# Patient Record
Sex: FEMALE | Race: WHITE | NOT HISPANIC OR LATINO | Employment: OTHER | RURAL
[De-identification: names, ages, dates, MRNs, and addresses within clinical notes are randomized per-mention and may not be internally consistent; named-entity substitution may affect disease eponyms.]

---

## 2017-05-18 ENCOUNTER — OFFICE VISIT (OUTPATIENT)
Dept: CARDIOLOGY | Facility: CLINIC | Age: 67
End: 2017-05-18

## 2017-05-18 VITALS — WEIGHT: 220 LBS | DIASTOLIC BLOOD PRESSURE: 102 MMHG | SYSTOLIC BLOOD PRESSURE: 154 MMHG | HEART RATE: 94 BPM

## 2017-05-18 DIAGNOSIS — M79.89 SWELLING OF LIMB: ICD-10-CM

## 2017-05-18 DIAGNOSIS — N28.0 RENAL INFARCT (HCC): ICD-10-CM

## 2017-05-18 DIAGNOSIS — I48.0 PAF (PAROXYSMAL ATRIAL FIBRILLATION) (HCC): Primary | ICD-10-CM

## 2017-05-18 PROCEDURE — 93000 ELECTROCARDIOGRAM COMPLETE: CPT | Performed by: INTERNAL MEDICINE

## 2017-05-18 PROCEDURE — 99213 OFFICE O/P EST LOW 20 MIN: CPT | Performed by: INTERNAL MEDICINE

## 2017-05-18 RX ORDER — PRAVASTATIN SODIUM 20 MG
20 TABLET ORAL DAILY
COMMUNITY

## 2017-05-18 RX ORDER — FUROSEMIDE 40 MG/1
40 TABLET ORAL DAILY
COMMUNITY

## 2017-05-18 RX ORDER — METOPROLOL TARTRATE 50 MG/1
75 TABLET, FILM COATED ORAL 2 TIMES DAILY
COMMUNITY

## 2017-05-18 RX ORDER — ACETAMINOPHEN 500 MG
500 TABLET ORAL EVERY 6 HOURS PRN
COMMUNITY

## 2017-05-18 RX ORDER — FLUTICASONE PROPIONATE 50 MCG
2 SPRAY, SUSPENSION (ML) NASAL DAILY
COMMUNITY

## 2025-04-03 ENCOUNTER — APPOINTMENT (OUTPATIENT)
Dept: GENERAL RADIOLOGY | Facility: HOSPITAL | Age: 75
End: 2025-04-03
Payer: MEDICARE

## 2025-04-03 ENCOUNTER — HOSPITAL ENCOUNTER (INPATIENT)
Facility: HOSPITAL | Age: 75
LOS: 4 days | Discharge: HOME HEALTH | End: 2025-04-08
Attending: STUDENT IN AN ORGANIZED HEALTH CARE EDUCATION/TRAINING PROGRAM | Admitting: STUDENT IN AN ORGANIZED HEALTH CARE EDUCATION/TRAINING PROGRAM
Payer: MEDICARE

## 2025-04-03 ENCOUNTER — APPOINTMENT (OUTPATIENT)
Dept: CT IMAGING | Facility: HOSPITAL | Age: 75
End: 2025-04-03
Payer: MEDICARE

## 2025-04-03 DIAGNOSIS — A41.9 SEPSIS WITH ACUTE ORGAN DYSFUNCTION WITHOUT SEPTIC SHOCK, DUE TO UNSPECIFIED ORGANISM, UNSPECIFIED ORGAN DYSFUNCTION TYPE: ICD-10-CM

## 2025-04-03 DIAGNOSIS — J96.01 ACUTE HYPOXEMIC RESPIRATORY FAILURE: ICD-10-CM

## 2025-04-03 DIAGNOSIS — I48.91 ATRIAL FIBRILLATION WITH RAPID VENTRICULAR RESPONSE: ICD-10-CM

## 2025-04-03 DIAGNOSIS — R65.20 SEPSIS WITH ACUTE ORGAN DYSFUNCTION WITHOUT SEPTIC SHOCK, DUE TO UNSPECIFIED ORGANISM, UNSPECIFIED ORGAN DYSFUNCTION TYPE: ICD-10-CM

## 2025-04-03 DIAGNOSIS — J18.9 MULTIFOCAL PNEUMONIA: Primary | ICD-10-CM

## 2025-04-03 LAB
ALBUMIN SERPL-MCNC: 3.7 G/DL (ref 3.5–5.2)
ALBUMIN/GLOB SERPL: 0.9 G/DL
ALP SERPL-CCNC: 112 U/L (ref 39–117)
ALT SERPL W P-5'-P-CCNC: 22 U/L (ref 1–33)
ANION GAP SERPL CALCULATED.3IONS-SCNC: 12.7 MMOL/L (ref 5–15)
APTT PPP: 40 SECONDS (ref 24.3–38.1)
ARTERIAL PATENCY WRIST A: ABNORMAL
AST SERPL-CCNC: 26 U/L (ref 1–32)
ATMOSPHERIC PRESS: 744 MMHG
BASE EXCESS BLDA CALC-SCNC: -1 MMOL/L (ref 0–2)
BASOPHILS # BLD AUTO: 0.04 10*3/MM3 (ref 0–0.2)
BASOPHILS NFR BLD AUTO: 0.2 % (ref 0–1.5)
BDY SITE: ABNORMAL
BILIRUB SERPL-MCNC: 1.7 MG/DL (ref 0–1.2)
BILIRUB UR QL STRIP: NEGATIVE
BODY TEMPERATURE: 37
BUN SERPL-MCNC: 10 MG/DL (ref 8–23)
BUN/CREAT SERPL: 11.1 (ref 7–25)
CALCIUM SPEC-SCNC: 10.6 MG/DL (ref 8.6–10.5)
CHLORIDE SERPL-SCNC: 101 MMOL/L (ref 98–107)
CLARITY UR: CLEAR
CO2 SERPL-SCNC: 25.3 MMOL/L (ref 22–29)
COLOR UR: ABNORMAL
CREAT SERPL-MCNC: 0.9 MG/DL (ref 0.57–1)
D-LACTATE SERPL-SCNC: 1.7 MMOL/L (ref 0.5–2)
DEPRECATED RDW RBC AUTO: 49 FL (ref 37–54)
EGFRCR SERPLBLD CKD-EPI 2021: 66.4 ML/MIN/1.73
EOSINOPHIL # BLD AUTO: 0 10*3/MM3 (ref 0–0.4)
EOSINOPHIL NFR BLD AUTO: 0 % (ref 0.3–6.2)
ERYTHROCYTE [DISTWIDTH] IN BLOOD BY AUTOMATED COUNT: 14.1 % (ref 12.3–15.4)
GAS FLOW AIRWAY: 6 LPM
GLOBULIN UR ELPH-MCNC: 4.2 GM/DL
GLUCOSE SERPL-MCNC: 177 MG/DL (ref 65–99)
GLUCOSE UR STRIP-MCNC: NEGATIVE MG/DL
HCO3 BLDA-SCNC: 26.1 MMOL/L (ref 20–26)
HCT VFR BLD AUTO: 47.8 % (ref 34–46.6)
HGB BLD-MCNC: 15.5 G/DL (ref 12–15.9)
HGB BLDA-MCNC: 17 G/DL (ref 13.5–17.5)
HGB UR QL STRIP.AUTO: ABNORMAL
HOLD SPECIMEN: NORMAL
HOLD SPECIMEN: NORMAL
IMM GRANULOCYTES # BLD AUTO: 0.1 10*3/MM3 (ref 0–0.05)
IMM GRANULOCYTES NFR BLD AUTO: 0.5 % (ref 0–0.5)
INR PPP: 3.39 (ref 0.9–1.1)
KETONES UR QL STRIP: NEGATIVE
LEUKOCYTE ESTERASE UR QL STRIP.AUTO: ABNORMAL
LYMPHOCYTES # BLD AUTO: 0.88 10*3/MM3 (ref 0.7–3.1)
LYMPHOCYTES NFR BLD AUTO: 4.5 % (ref 19.6–45.3)
Lab: ABNORMAL
MCH RBC QN AUTO: 30.8 PG (ref 26.6–33)
MCHC RBC AUTO-ENTMCNC: 32.4 G/DL (ref 31.5–35.7)
MCV RBC AUTO: 94.8 FL (ref 79–97)
MODALITY: ABNORMAL
MONOCYTES # BLD AUTO: 1.45 10*3/MM3 (ref 0.1–0.9)
MONOCYTES NFR BLD AUTO: 7.4 % (ref 5–12)
NEUTROPHILS NFR BLD AUTO: 17.15 10*3/MM3 (ref 1.7–7)
NEUTROPHILS NFR BLD AUTO: 87.4 % (ref 42.7–76)
NITRITE UR QL STRIP: NEGATIVE
NRBC BLD AUTO-RTO: 0 /100 WBC (ref 0–0.2)
NT-PROBNP SERPL-MCNC: 428 PG/ML (ref 0–1800)
PCO2 BLDA: 50.8 MM HG (ref 35–45)
PCO2 TEMP ADJ BLD: 50.8 MM HG (ref 35–45)
PH BLDA: 7.32 PH UNITS (ref 7.35–7.45)
PH UR STRIP.AUTO: 7 [PH] (ref 4.5–8)
PH, TEMP CORRECTED: 7.32 PH UNITS (ref 7.35–7.45)
PLATELET # BLD AUTO: 291 10*3/MM3 (ref 140–450)
PMV BLD AUTO: 9.3 FL (ref 6–12)
PO2 BLDA: 146 MM HG (ref 83–108)
PO2 TEMP ADJ BLD: 146 MM HG (ref 83–108)
POTASSIUM SERPL-SCNC: 4.2 MMOL/L (ref 3.5–5.2)
PROCALCITONIN SERPL-MCNC: 0.25 NG/ML (ref 0–0.25)
PROT SERPL-MCNC: 7.9 G/DL (ref 6–8.5)
PROT UR QL STRIP: ABNORMAL
PROTHROMBIN TIME: 35 SECONDS (ref 12.1–15)
RBC # BLD AUTO: 5.04 10*6/MM3 (ref 3.77–5.28)
SAO2 % BLDCOA: 98.1 % (ref 94–99)
SODIUM SERPL-SCNC: 139 MMOL/L (ref 136–145)
SP GR UR STRIP: 1.03 (ref 1–1.03)
TROPONIN T SERPL HS-MCNC: 22 NG/L
UROBILINOGEN UR QL STRIP: ABNORMAL
VENTILATOR MODE: ABNORMAL
WBC NRBC COR # BLD AUTO: 19.62 10*3/MM3 (ref 3.4–10.8)
WHOLE BLOOD HOLD COAG: NORMAL
WHOLE BLOOD HOLD SPECIMEN: NORMAL

## 2025-04-03 PROCEDURE — 71045 X-RAY EXAM CHEST 1 VIEW: CPT

## 2025-04-03 PROCEDURE — 71275 CT ANGIOGRAPHY CHEST: CPT

## 2025-04-03 PROCEDURE — 25810000003 SODIUM CHLORIDE 0.9 % SOLUTION: Performed by: STUDENT IN AN ORGANIZED HEALTH CARE EDUCATION/TRAINING PROGRAM

## 2025-04-03 PROCEDURE — 25010000002 CEFEPIME PER 500 MG: Performed by: STUDENT IN AN ORGANIZED HEALTH CARE EDUCATION/TRAINING PROGRAM

## 2025-04-03 PROCEDURE — 85610 PROTHROMBIN TIME: CPT | Performed by: STUDENT IN AN ORGANIZED HEALTH CARE EDUCATION/TRAINING PROGRAM

## 2025-04-03 PROCEDURE — 25010000002 AZITHROMYCIN PER 500 MG: Performed by: STUDENT IN AN ORGANIZED HEALTH CARE EDUCATION/TRAINING PROGRAM

## 2025-04-03 PROCEDURE — 85730 THROMBOPLASTIN TIME PARTIAL: CPT | Performed by: STUDENT IN AN ORGANIZED HEALTH CARE EDUCATION/TRAINING PROGRAM

## 2025-04-03 PROCEDURE — 93005 ELECTROCARDIOGRAM TRACING: CPT | Performed by: STUDENT IN AN ORGANIZED HEALTH CARE EDUCATION/TRAINING PROGRAM

## 2025-04-03 PROCEDURE — 84145 PROCALCITONIN (PCT): CPT | Performed by: STUDENT IN AN ORGANIZED HEALTH CARE EDUCATION/TRAINING PROGRAM

## 2025-04-03 PROCEDURE — 82803 BLOOD GASES ANY COMBINATION: CPT

## 2025-04-03 PROCEDURE — 83605 ASSAY OF LACTIC ACID: CPT | Performed by: STUDENT IN AN ORGANIZED HEALTH CARE EDUCATION/TRAINING PROGRAM

## 2025-04-03 PROCEDURE — 99285 EMERGENCY DEPT VISIT HI MDM: CPT | Performed by: STUDENT IN AN ORGANIZED HEALTH CARE EDUCATION/TRAINING PROGRAM

## 2025-04-03 PROCEDURE — 25810000003 SODIUM CHLORIDE 0.9 % SOLUTION 500 ML FLEX CONT: Performed by: STUDENT IN AN ORGANIZED HEALTH CARE EDUCATION/TRAINING PROGRAM

## 2025-04-03 PROCEDURE — 93010 ELECTROCARDIOGRAM REPORT: CPT | Performed by: INTERNAL MEDICINE

## 2025-04-03 PROCEDURE — 85025 COMPLETE CBC W/AUTO DIFF WBC: CPT | Performed by: STUDENT IN AN ORGANIZED HEALTH CARE EDUCATION/TRAINING PROGRAM

## 2025-04-03 PROCEDURE — 36600 WITHDRAWAL OF ARTERIAL BLOOD: CPT

## 2025-04-03 PROCEDURE — 25010000002 VANCOMYCIN 1.75 G RECONSTITUTED SOLUTION 1 EACH VIAL: Performed by: STUDENT IN AN ORGANIZED HEALTH CARE EDUCATION/TRAINING PROGRAM

## 2025-04-03 PROCEDURE — 87040 BLOOD CULTURE FOR BACTERIA: CPT | Performed by: STUDENT IN AN ORGANIZED HEALTH CARE EDUCATION/TRAINING PROGRAM

## 2025-04-03 PROCEDURE — 83880 ASSAY OF NATRIURETIC PEPTIDE: CPT | Performed by: STUDENT IN AN ORGANIZED HEALTH CARE EDUCATION/TRAINING PROGRAM

## 2025-04-03 PROCEDURE — 25810000003 SODIUM CHLORIDE 0.9 % SOLUTION 250 ML FLEX CONT: Performed by: STUDENT IN AN ORGANIZED HEALTH CARE EDUCATION/TRAINING PROGRAM

## 2025-04-03 PROCEDURE — 81001 URINALYSIS AUTO W/SCOPE: CPT | Performed by: STUDENT IN AN ORGANIZED HEALTH CARE EDUCATION/TRAINING PROGRAM

## 2025-04-03 PROCEDURE — 80053 COMPREHEN METABOLIC PANEL: CPT | Performed by: STUDENT IN AN ORGANIZED HEALTH CARE EDUCATION/TRAINING PROGRAM

## 2025-04-03 PROCEDURE — 84484 ASSAY OF TROPONIN QUANT: CPT | Performed by: STUDENT IN AN ORGANIZED HEALTH CARE EDUCATION/TRAINING PROGRAM

## 2025-04-03 PROCEDURE — P9612 CATHETERIZE FOR URINE SPEC: HCPCS

## 2025-04-03 RX ORDER — SODIUM CHLORIDE 0.9 % (FLUSH) 0.9 %
10 SYRINGE (ML) INJECTION AS NEEDED
Status: DISCONTINUED | OUTPATIENT
Start: 2025-04-03 | End: 2025-04-08 | Stop reason: HOSPADM

## 2025-04-03 RX ORDER — FUROSEMIDE 10 MG/ML
60 INJECTION INTRAMUSCULAR; INTRAVENOUS ONCE
Status: DISCONTINUED | OUTPATIENT
Start: 2025-04-03 | End: 2025-04-03

## 2025-04-03 RX ORDER — DEXAMETHASONE SODIUM PHOSPHATE 4 MG/ML
10 INJECTION, SOLUTION INTRA-ARTICULAR; INTRALESIONAL; INTRAMUSCULAR; INTRAVENOUS; SOFT TISSUE ONCE
Status: COMPLETED | OUTPATIENT
Start: 2025-04-03 | End: 2025-04-04

## 2025-04-03 RX ORDER — LACTULOSE 10 G/15ML
10 SOLUTION ORAL 3 TIMES DAILY
COMMUNITY
Start: 2025-04-01

## 2025-04-03 RX ORDER — IPRATROPIUM BROMIDE AND ALBUTEROL SULFATE 2.5; .5 MG/3ML; MG/3ML
3 SOLUTION RESPIRATORY (INHALATION) ONCE
Status: COMPLETED | OUTPATIENT
Start: 2025-04-03 | End: 2025-04-04

## 2025-04-03 RX ADMIN — AZITHROMYCIN MONOHYDRATE 500 MG: 500 INJECTION, POWDER, LYOPHILIZED, FOR SOLUTION INTRAVENOUS at 23:37

## 2025-04-03 RX ADMIN — SODIUM CHLORIDE 500 ML: 0.9 INJECTION, SOLUTION INTRAVENOUS at 23:16

## 2025-04-03 RX ADMIN — CEFEPIME 2000 MG: 2 INJECTION, POWDER, FOR SOLUTION INTRAVENOUS at 23:16

## 2025-04-03 RX ADMIN — VANCOMYCIN HYDROCHLORIDE 1750 MG: 1.75 INJECTION, POWDER, LYOPHILIZED, FOR SOLUTION INTRAVENOUS at 23:27

## 2025-04-04 ENCOUNTER — APPOINTMENT (OUTPATIENT)
Dept: ULTRASOUND IMAGING | Facility: HOSPITAL | Age: 75
End: 2025-04-04
Payer: MEDICARE

## 2025-04-04 PROBLEM — I10 HTN (HYPERTENSION): Status: ACTIVE | Noted: 2025-04-04

## 2025-04-04 PROBLEM — E78.5 HLD (HYPERLIPIDEMIA): Status: ACTIVE | Noted: 2025-04-04

## 2025-04-04 PROBLEM — J18.9 PNEUMONIA: Status: ACTIVE | Noted: 2025-04-04

## 2025-04-04 PROBLEM — I48.20 CHRONIC ATRIAL FIBRILLATION WITH RVR: Status: ACTIVE | Noted: 2025-04-04

## 2025-04-04 PROBLEM — J47.9 BRONCHIECTASIS: Status: ACTIVE | Noted: 2025-04-04

## 2025-04-04 PROBLEM — A41.9 SEPSIS DUE TO PNEUMONIA: Status: ACTIVE | Noted: 2025-04-04

## 2025-04-04 PROBLEM — J96.01 ACUTE HYPOXIC RESPIRATORY FAILURE: Status: ACTIVE | Noted: 2025-04-04

## 2025-04-04 LAB
ALBUMIN SERPL-MCNC: 3.2 G/DL (ref 3.5–5.2)
ALBUMIN/GLOB SERPL: 0.9 G/DL
ALP SERPL-CCNC: 94 U/L (ref 39–117)
ALT SERPL W P-5'-P-CCNC: 24 U/L (ref 1–33)
AMMONIA BLD-SCNC: 53 UMOL/L (ref 11–51)
ANION GAP SERPL CALCULATED.3IONS-SCNC: 10.3 MMOL/L (ref 5–15)
AST SERPL-CCNC: 25 U/L (ref 1–32)
BACTERIA UR QL AUTO: ABNORMAL /HPF
BILIRUB SERPL-MCNC: 1.3 MG/DL (ref 0–1.2)
BUN SERPL-MCNC: 11 MG/DL (ref 8–23)
BUN/CREAT SERPL: 14.5 (ref 7–25)
CALCIUM SPEC-SCNC: 9.7 MG/DL (ref 8.6–10.5)
CHLORIDE SERPL-SCNC: 105 MMOL/L (ref 98–107)
CO2 SERPL-SCNC: 22.7 MMOL/L (ref 22–29)
CREAT SERPL-MCNC: 0.76 MG/DL (ref 0.57–1)
DEPRECATED RDW RBC AUTO: 49.7 FL (ref 37–54)
EGFRCR SERPLBLD CKD-EPI 2021: 81.3 ML/MIN/1.73
ERYTHROCYTE [DISTWIDTH] IN BLOOD BY AUTOMATED COUNT: 14.2 % (ref 12.3–15.4)
GEN 5 1HR TROPONIN T REFLEX: 25 NG/L
GLOBULIN UR ELPH-MCNC: 3.6 GM/DL
GLUCOSE BLDC GLUCOMTR-MCNC: 108 MG/DL (ref 70–130)
GLUCOSE BLDC GLUCOMTR-MCNC: 148 MG/DL (ref 70–130)
GLUCOSE BLDC GLUCOMTR-MCNC: 204 MG/DL (ref 70–130)
GLUCOSE BLDC GLUCOMTR-MCNC: 231 MG/DL (ref 70–130)
GLUCOSE SERPL-MCNC: 229 MG/DL (ref 65–99)
HCT VFR BLD AUTO: 49.9 % (ref 34–46.6)
HGB BLD-MCNC: 16.4 G/DL (ref 12–15.9)
HYALINE CASTS UR QL AUTO: ABNORMAL /LPF
INR PPP: 3.54 (ref 0.9–1.1)
MCH RBC QN AUTO: 31.2 PG (ref 26.6–33)
MCHC RBC AUTO-ENTMCNC: 32.9 G/DL (ref 31.5–35.7)
MCV RBC AUTO: 94.9 FL (ref 79–97)
MRSA DNA SPEC QL NAA+PROBE: NORMAL
PLATELET # BLD AUTO: 345 10*3/MM3 (ref 140–450)
PMV BLD AUTO: 9.9 FL (ref 6–12)
POTASSIUM SERPL-SCNC: 4.3 MMOL/L (ref 3.5–5.2)
PROT SERPL-MCNC: 6.8 G/DL (ref 6–8.5)
PROTHROMBIN TIME: 36.2 SECONDS (ref 12.1–15)
QT INTERVAL: 293 MS
QTC INTERVAL: 427 MS
RBC # BLD AUTO: 5.26 10*6/MM3 (ref 3.77–5.28)
RBC # UR STRIP: ABNORMAL /HPF
RBC CASTS #/AREA URNS LPF: ABNORMAL /LPF
REF LAB TEST METHOD: ABNORMAL
SODIUM SERPL-SCNC: 138 MMOL/L (ref 136–145)
SQUAMOUS #/AREA URNS HPF: ABNORMAL /HPF
TRANS CELLS #/AREA URNS HPF: ABNORMAL /HPF
TROPONIN T % DELTA: 14
TROPONIN T NUMERIC DELTA: 3 NG/L
VANCOMYCIN SERPL-MCNC: 11.6 MCG/ML (ref 5–40)
WBC # UR STRIP: ABNORMAL /HPF
WBC NRBC COR # BLD AUTO: 22.97 10*3/MM3 (ref 3.4–10.8)

## 2025-04-04 PROCEDURE — 82140 ASSAY OF AMMONIA: CPT | Performed by: STUDENT IN AN ORGANIZED HEALTH CARE EDUCATION/TRAINING PROGRAM

## 2025-04-04 PROCEDURE — 97161 PT EVAL LOW COMPLEX 20 MIN: CPT

## 2025-04-04 PROCEDURE — 25510000001 IOPAMIDOL PER 1 ML: Performed by: STUDENT IN AN ORGANIZED HEALTH CARE EDUCATION/TRAINING PROGRAM

## 2025-04-04 PROCEDURE — 94664 DEMO&/EVAL PT USE INHALER: CPT

## 2025-04-04 PROCEDURE — 25010000002 CEFEPIME PER 500 MG: Performed by: STUDENT IN AN ORGANIZED HEALTH CARE EDUCATION/TRAINING PROGRAM

## 2025-04-04 PROCEDURE — 94799 UNLISTED PULMONARY SVC/PX: CPT

## 2025-04-04 PROCEDURE — 63710000001 INSULIN LISPRO (HUMAN) PER 5 UNITS: Performed by: STUDENT IN AN ORGANIZED HEALTH CARE EDUCATION/TRAINING PROGRAM

## 2025-04-04 PROCEDURE — 94761 N-INVAS EAR/PLS OXIMETRY MLT: CPT

## 2025-04-04 PROCEDURE — 87641 MR-STAPH DNA AMP PROBE: CPT | Performed by: STUDENT IN AN ORGANIZED HEALTH CARE EDUCATION/TRAINING PROGRAM

## 2025-04-04 PROCEDURE — 92610 EVALUATE SWALLOWING FUNCTION: CPT

## 2025-04-04 PROCEDURE — 99223 1ST HOSP IP/OBS HIGH 75: CPT | Performed by: STUDENT IN AN ORGANIZED HEALTH CARE EDUCATION/TRAINING PROGRAM

## 2025-04-04 PROCEDURE — 85610 PROTHROMBIN TIME: CPT | Performed by: STUDENT IN AN ORGANIZED HEALTH CARE EDUCATION/TRAINING PROGRAM

## 2025-04-04 PROCEDURE — 25010000002 DEXAMETHASONE PER 1 MG: Performed by: STUDENT IN AN ORGANIZED HEALTH CARE EDUCATION/TRAINING PROGRAM

## 2025-04-04 PROCEDURE — 82948 REAGENT STRIP/BLOOD GLUCOSE: CPT

## 2025-04-04 PROCEDURE — 94640 AIRWAY INHALATION TREATMENT: CPT

## 2025-04-04 PROCEDURE — 97165 OT EVAL LOW COMPLEX 30 MIN: CPT

## 2025-04-04 PROCEDURE — 84484 ASSAY OF TROPONIN QUANT: CPT | Performed by: STUDENT IN AN ORGANIZED HEALTH CARE EDUCATION/TRAINING PROGRAM

## 2025-04-04 PROCEDURE — 97535 SELF CARE MNGMENT TRAINING: CPT

## 2025-04-04 PROCEDURE — 80053 COMPREHEN METABOLIC PANEL: CPT | Performed by: STUDENT IN AN ORGANIZED HEALTH CARE EDUCATION/TRAINING PROGRAM

## 2025-04-04 PROCEDURE — 25010000002 VANCOMYCIN 1.5 G RECONSTITUTED SOLUTION 1 EACH VIAL: Performed by: STUDENT IN AN ORGANIZED HEALTH CARE EDUCATION/TRAINING PROGRAM

## 2025-04-04 PROCEDURE — 76705 ECHO EXAM OF ABDOMEN: CPT

## 2025-04-04 PROCEDURE — 25810000003 SODIUM CHLORIDE 0.9 % SOLUTION 500 ML FLEX CONT: Performed by: STUDENT IN AN ORGANIZED HEALTH CARE EDUCATION/TRAINING PROGRAM

## 2025-04-04 PROCEDURE — 85027 COMPLETE CBC AUTOMATED: CPT | Performed by: STUDENT IN AN ORGANIZED HEALTH CARE EDUCATION/TRAINING PROGRAM

## 2025-04-04 PROCEDURE — 80202 ASSAY OF VANCOMYCIN: CPT | Performed by: STUDENT IN AN ORGANIZED HEALTH CARE EDUCATION/TRAINING PROGRAM

## 2025-04-04 RX ORDER — NYSTATIN 100000 U/G
1 OINTMENT TOPICAL EVERY 12 HOURS SCHEDULED
Status: DISCONTINUED | OUTPATIENT
Start: 2025-04-04 | End: 2025-04-08 | Stop reason: HOSPADM

## 2025-04-04 RX ORDER — WARFARIN SODIUM 7.5 MG/1
7.5 TABLET ORAL
COMMUNITY

## 2025-04-04 RX ORDER — METOPROLOL TARTRATE 25 MG/1
25 TABLET, FILM COATED ORAL EVERY 12 HOURS SCHEDULED
Status: DISCONTINUED | OUTPATIENT
Start: 2025-04-04 | End: 2025-04-08 | Stop reason: HOSPADM

## 2025-04-04 RX ORDER — DILTIAZEM HYDROCHLORIDE 5 MG/ML
10 INJECTION INTRAVENOUS ONCE
Status: COMPLETED | OUTPATIENT
Start: 2025-04-04 | End: 2025-04-04

## 2025-04-04 RX ORDER — ACETAMINOPHEN 160 MG/5ML
650 SOLUTION ORAL EVERY 4 HOURS PRN
Status: DISCONTINUED | OUTPATIENT
Start: 2025-04-04 | End: 2025-04-08 | Stop reason: HOSPADM

## 2025-04-04 RX ORDER — NITROGLYCERIN 0.4 MG/1
0.4 TABLET SUBLINGUAL
Status: DISCONTINUED | OUTPATIENT
Start: 2025-04-04 | End: 2025-04-08 | Stop reason: HOSPADM

## 2025-04-04 RX ORDER — ALBUTEROL SULFATE 0.83 MG/ML
2.5 SOLUTION RESPIRATORY (INHALATION)
Status: COMPLETED | OUTPATIENT
Start: 2025-04-04 | End: 2025-04-04

## 2025-04-04 RX ORDER — IPRATROPIUM BROMIDE AND ALBUTEROL SULFATE 2.5; .5 MG/3ML; MG/3ML
3 SOLUTION RESPIRATORY (INHALATION)
Status: DISCONTINUED | OUTPATIENT
Start: 2025-04-04 | End: 2025-04-07

## 2025-04-04 RX ORDER — SODIUM CHLORIDE 9 MG/ML
40 INJECTION, SOLUTION INTRAVENOUS AS NEEDED
Status: DISCONTINUED | OUTPATIENT
Start: 2025-04-04 | End: 2025-04-08 | Stop reason: HOSPADM

## 2025-04-04 RX ORDER — ATORVASTATIN CALCIUM 40 MG/1
40 TABLET, FILM COATED ORAL NIGHTLY
Status: DISCONTINUED | OUTPATIENT
Start: 2025-04-04 | End: 2025-04-08 | Stop reason: HOSPADM

## 2025-04-04 RX ORDER — METOPROLOL SUCCINATE 25 MG/1
25 TABLET, EXTENDED RELEASE ORAL DAILY
COMMUNITY

## 2025-04-04 RX ORDER — NICOTINE POLACRILEX 4 MG
15 LOZENGE BUCCAL
Status: DISCONTINUED | OUTPATIENT
Start: 2025-04-04 | End: 2025-04-08 | Stop reason: HOSPADM

## 2025-04-04 RX ORDER — AMOXICILLIN 250 MG
2 CAPSULE ORAL 2 TIMES DAILY PRN
Status: DISCONTINUED | OUTPATIENT
Start: 2025-04-04 | End: 2025-04-08 | Stop reason: HOSPADM

## 2025-04-04 RX ORDER — ACETAMINOPHEN 325 MG/1
650 TABLET ORAL EVERY 4 HOURS PRN
Status: DISCONTINUED | OUTPATIENT
Start: 2025-04-04 | End: 2025-04-08 | Stop reason: HOSPADM

## 2025-04-04 RX ORDER — NYSTATIN 100000 [USP'U]/G
POWDER TOPICAL EVERY 12 HOURS SCHEDULED
Status: DISCONTINUED | OUTPATIENT
Start: 2025-04-04 | End: 2025-04-08 | Stop reason: HOSPADM

## 2025-04-04 RX ORDER — ALBUTEROL SULFATE 0.83 MG/ML
SOLUTION RESPIRATORY (INHALATION)
Status: COMPLETED
Start: 2025-04-04 | End: 2025-04-04

## 2025-04-04 RX ORDER — UBIDECARENONE 75 MG
100 CAPSULE ORAL DAILY
Status: DISCONTINUED | OUTPATIENT
Start: 2025-04-04 | End: 2025-04-04

## 2025-04-04 RX ORDER — UBIDECARENONE 75 MG
100 CAPSULE ORAL DAILY
Status: DISCONTINUED | OUTPATIENT
Start: 2025-04-05 | End: 2025-04-08 | Stop reason: HOSPADM

## 2025-04-04 RX ORDER — ACETAMINOPHEN 650 MG/1
650 SUPPOSITORY RECTAL EVERY 4 HOURS PRN
Status: DISCONTINUED | OUTPATIENT
Start: 2025-04-04 | End: 2025-04-08 | Stop reason: HOSPADM

## 2025-04-04 RX ORDER — DEXTROSE MONOHYDRATE 25 G/50ML
25 INJECTION, SOLUTION INTRAVENOUS
Status: DISCONTINUED | OUTPATIENT
Start: 2025-04-04 | End: 2025-04-08 | Stop reason: HOSPADM

## 2025-04-04 RX ORDER — WARFARIN SODIUM 5 MG/1
5 TABLET ORAL
Status: DISCONTINUED | OUTPATIENT
Start: 2025-04-04 | End: 2025-04-04

## 2025-04-04 RX ORDER — SODIUM CHLORIDE 0.9 % (FLUSH) 0.9 %
10 SYRINGE (ML) INJECTION EVERY 12 HOURS SCHEDULED
Status: DISCONTINUED | OUTPATIENT
Start: 2025-04-04 | End: 2025-04-08 | Stop reason: HOSPADM

## 2025-04-04 RX ORDER — SODIUM CHLORIDE 0.9 % (FLUSH) 0.9 %
10 SYRINGE (ML) INJECTION AS NEEDED
Status: DISCONTINUED | OUTPATIENT
Start: 2025-04-04 | End: 2025-04-08 | Stop reason: HOSPADM

## 2025-04-04 RX ORDER — INSULIN LISPRO 100 [IU]/ML
2-7 INJECTION, SOLUTION INTRAVENOUS; SUBCUTANEOUS
Status: DISCONTINUED | OUTPATIENT
Start: 2025-04-04 | End: 2025-04-08 | Stop reason: HOSPADM

## 2025-04-04 RX ORDER — ALBUTEROL SULFATE 90 UG/1
2 INHALANT RESPIRATORY (INHALATION) EVERY 4 HOURS PRN
COMMUNITY

## 2025-04-04 RX ORDER — SPIRONOLACTONE 100 MG/1
100 TABLET, FILM COATED ORAL DAILY
COMMUNITY

## 2025-04-04 RX ORDER — UBIDECARENONE 75 MG
100 CAPSULE ORAL DAILY
COMMUNITY

## 2025-04-04 RX ORDER — LACTULOSE 10 G/15ML
10 SOLUTION ORAL 2 TIMES DAILY
Status: DISCONTINUED | OUTPATIENT
Start: 2025-04-04 | End: 2025-04-08 | Stop reason: HOSPADM

## 2025-04-04 RX ORDER — BISACODYL 5 MG/1
5 TABLET, DELAYED RELEASE ORAL DAILY PRN
Status: DISCONTINUED | OUTPATIENT
Start: 2025-04-04 | End: 2025-04-08 | Stop reason: HOSPADM

## 2025-04-04 RX ORDER — IOPAMIDOL 755 MG/ML
100 INJECTION, SOLUTION INTRAVASCULAR
Status: COMPLETED | OUTPATIENT
Start: 2025-04-04 | End: 2025-04-04

## 2025-04-04 RX ORDER — BISACODYL 10 MG
10 SUPPOSITORY, RECTAL RECTAL DAILY PRN
Status: DISCONTINUED | OUTPATIENT
Start: 2025-04-04 | End: 2025-04-08 | Stop reason: HOSPADM

## 2025-04-04 RX ORDER — ATORVASTATIN CALCIUM 40 MG/1
40 TABLET, FILM COATED ORAL NIGHTLY
COMMUNITY

## 2025-04-04 RX ORDER — ALBUTEROL SULFATE 0.83 MG/ML
2.5 SOLUTION RESPIRATORY (INHALATION) EVERY 4 HOURS PRN
Status: DISCONTINUED | OUTPATIENT
Start: 2025-04-04 | End: 2025-04-08 | Stop reason: HOSPADM

## 2025-04-04 RX ORDER — POLYETHYLENE GLYCOL 3350 17 G/17G
17 POWDER, FOR SOLUTION ORAL DAILY PRN
Status: DISCONTINUED | OUTPATIENT
Start: 2025-04-04 | End: 2025-04-08 | Stop reason: HOSPADM

## 2025-04-04 RX ORDER — IBUPROFEN 600 MG/1
1 TABLET ORAL
Status: DISCONTINUED | OUTPATIENT
Start: 2025-04-04 | End: 2025-04-08 | Stop reason: HOSPADM

## 2025-04-04 RX ADMIN — ATORVASTATIN CALCIUM 40 MG: 40 TABLET, FILM COATED ORAL at 21:28

## 2025-04-04 RX ADMIN — ALBUTEROL SULFATE 2.5 MG: 2.5 SOLUTION RESPIRATORY (INHALATION) at 00:24

## 2025-04-04 RX ADMIN — IPRATROPIUM BROMIDE AND ALBUTEROL SULFATE 3 ML: .5; 3 SOLUTION RESPIRATORY (INHALATION) at 09:19

## 2025-04-04 RX ADMIN — NYSTATIN: 100000 POWDER TOPICAL at 21:33

## 2025-04-04 RX ADMIN — IPRATROPIUM BROMIDE AND ALBUTEROL SULFATE 3 ML: .5; 3 SOLUTION RESPIRATORY (INHALATION) at 20:11

## 2025-04-04 RX ADMIN — ALBUTEROL SULFATE 2.5 MG: 2.5 SOLUTION RESPIRATORY (INHALATION) at 00:19

## 2025-04-04 RX ADMIN — IPRATROPIUM BROMIDE AND ALBUTEROL SULFATE 3 ML: .5; 3 SOLUTION RESPIRATORY (INHALATION) at 00:03

## 2025-04-04 RX ADMIN — LACTULOSE 10 G: 20 SOLUTION ORAL at 09:44

## 2025-04-04 RX ADMIN — NYSTATIN: 100000 POWDER TOPICAL at 09:44

## 2025-04-04 RX ADMIN — NYSTATIN 1 APPLICATION: 100000 OINTMENT TOPICAL at 21:33

## 2025-04-04 RX ADMIN — SODIUM CHLORIDE 1500 MG: 9 INJECTION, SOLUTION INTRAVENOUS at 18:54

## 2025-04-04 RX ADMIN — INSULIN LISPRO 2 UNITS: 100 INJECTION, SOLUTION INTRAVENOUS; SUBCUTANEOUS at 09:49

## 2025-04-04 RX ADMIN — Medication 10 ML: at 21:33

## 2025-04-04 RX ADMIN — Medication 10 ML: at 09:44

## 2025-04-04 RX ADMIN — CEFEPIME 2000 MG: 2 INJECTION, POWDER, FOR SOLUTION INTRAVENOUS at 06:33

## 2025-04-04 RX ADMIN — METOPROLOL TARTRATE 25 MG: 25 TABLET, FILM COATED ORAL at 09:44

## 2025-04-04 RX ADMIN — IOPAMIDOL 100 ML: 755 INJECTION, SOLUTION INTRAVENOUS at 00:52

## 2025-04-04 RX ADMIN — METOPROLOL TARTRATE 25 MG: 25 TABLET, FILM COATED ORAL at 21:28

## 2025-04-04 RX ADMIN — NYSTATIN 1 APPLICATION: 100000 OINTMENT TOPICAL at 16:10

## 2025-04-04 RX ADMIN — CEFEPIME 2000 MG: 2 INJECTION, POWDER, FOR SOLUTION INTRAVENOUS at 21:28

## 2025-04-04 RX ADMIN — DEXAMETHASONE SODIUM PHOSPHATE 10 MG: 4 INJECTION INTRA-ARTICULAR; INTRALESIONAL; INTRAMUSCULAR; INTRAVENOUS; SOFT TISSUE at 01:19

## 2025-04-04 RX ADMIN — INSULIN LISPRO 3 UNITS: 100 INJECTION, SOLUTION INTRAVENOUS; SUBCUTANEOUS at 12:48

## 2025-04-04 RX ADMIN — ACETAMINOPHEN 650 MG: 325 TABLET, FILM COATED ORAL at 06:38

## 2025-04-04 RX ADMIN — LACTULOSE 10 G: 20 SOLUTION ORAL at 21:28

## 2025-04-04 RX ADMIN — DILTIAZEM HYDROCHLORIDE 10 MG: 5 INJECTION, SOLUTION INTRAVENOUS at 00:53

## 2025-04-04 RX ADMIN — Medication 10 ML: at 02:50

## 2025-04-04 NOTE — PROGRESS NOTES
"Patient seen and examined. Sitting up in chair with multiple family members in the room. They report she has had multiple hospitalizations over the past few months and has been treated for multiple \"pneumonias\" and \"utis\". She was also recently started on lactulose for elevated ammonia. She is quite sedentary at home. Patient's family have concerns due to her multiple hospitalizations and recurrent infections.   " 11-Apr-2023

## 2025-04-04 NOTE — CASE MANAGEMENT/SOCIAL WORK
Continued Stay Note  JUSTIN Pantoja     Patient Name: Nikki Merino  MRN: [id number]  Today's Date: 4/4/2025    Admit Date: 4/3/2025    Plan: Plan home with    Discharge Plan       Row Name 04/04/25 1427       Plan    Plan Plan home with     Patient/Family in Agreement with Plan yes    Plan Comments Spoke with patient at [facility]. Permission to speak with multiple visitors present. Face sheet verified. Patient lives in a mobile home with her . She is independent of ADLs but does not drive. Family provides transportation as needed. She has cane, rw, rollator and 02@HS per [facility].  She has used [facility]  in the past. She has not been to inpatient rehab previously. She does not have a living will. She sees Dr Daly as PCP. She uses [facility] and denies issues obtainig medications. She plans to return home with family to assist as needed. CM # placed on white board, will continue to follow.                   Discharge Codes    No documentation.                       Jv Blanton RN

## 2025-04-04 NOTE — ED PROVIDER NOTES
Subjective   History of Present Illness  74-year-old female with a past medical history of    74-year-old female with a past medical history of memory impairment, recent fatigue, as well as bronchiectasis, hypertension, hyperlipidemia, type 2 diabetes, A-fib, presenting with complaint that she has had shortness of breath that is progressively getting worse that she is coughing up green sputum.  She notes that she was recently discharged 2 weeks ago for pneumonia.  She was discharged with antibiotics and subsequently got better but now she is starting to get worse.  Saw primary care and noted that she was excessively sleepy, checked ammonia level noted to be in the 50s started on lactulose.  She does not report any improper swallowing, she is never woke up choking.  She also does not report any fever or chill at home she does reports worsening cough and shortness of breath.  She has no chest pain she has no diaphoresis, no nausea.    Review of Systems    No past medical history on file.    Allergies   Allergen Reactions    Ciprofloxacin     Meperidine And Related     Penicillins        Past Surgical History:   Procedure Laterality Date    LUNG REMOVAL, PARTIAL      LEFT LOWER LUNG       Family History   Problem Relation Age of Onset    Heart disease Mother     Leukemia Mother     Stomach cancer Father        Social History     Socioeconomic History    Marital status:    Tobacco Use    Smoking status: Never    Tobacco comments:     EXPOSED TO 2ND HAND SMOKE   Substance and Sexual Activity    Alcohol use: No           Objective   Physical Exam  Vitals and nursing note reviewed. Exam conducted with a chaperone present.   Constitutional:       General: She is not in acute distress.     Appearance: Normal appearance. She is ill-appearing. She is not toxic-appearing or diaphoretic.   HENT:      Head: Normocephalic and atraumatic.      Nose: Nose normal.      Mouth/Throat:      Pharynx: No oropharyngeal exudate or  posterior oropharyngeal erythema.      Comments: Dry  Eyes:      Extraocular Movements: Extraocular movements intact.      Conjunctiva/sclera: Conjunctivae normal.      Pupils: Pupils are equal, round, and reactive to light.   Cardiovascular:      Rate and Rhythm: Tachycardia present. Rhythm irregular. No extrasystoles are present.     Heart sounds: No murmur heard.  Pulmonary:      Effort: Pulmonary effort is normal. Tachypnea present. No accessory muscle usage or respiratory distress.      Breath sounds: No stridor. Decreased breath sounds and wheezing present. No rhonchi or rales.   Chest:      Chest wall: No mass.   Abdominal:      General: Abdomen is flat. There is no distension.      Palpations: Abdomen is soft.      Tenderness: There is no abdominal tenderness. There is no guarding or rebound.   Musculoskeletal:         General: No swelling, tenderness, deformity or signs of injury. Normal range of motion.      Cervical back: Normal range of motion.      Right lower leg: Edema present.      Left lower leg: Edema present.      Comments: 1+ pitting edema   Skin:     General: Skin is warm and dry.      Capillary Refill: Capillary refill takes less than 2 seconds.      Findings: No erythema or rash.   Neurological:      General: No focal deficit present.      Mental Status: She is alert and oriented to person, place, and time. Mental status is at baseline.      Cranial Nerves: No cranial nerve deficit.      Sensory: No sensory deficit.      Motor: No weakness.   Psychiatric:         Mood and Affect: Mood normal.         Procedures           ED Course  ED Course as of 04/04/25 0122   Thu Apr 03, 2025   2340 Noted pleural effusions but not pulmonary edema and patient heart rate persistently elevated from 110s to 140s.  She has pitting edema but she does not have crackles on her lung examination, will place her on heated high flow and give her breathing treatments and monitor for clinical response of heart rate as a  believe the primary   of her A-fib with RVR is possibly sepsis versus hypoxia as she was originally having a heart rate between 130 and 150 but improved on 6 L via NC [AK]   Fri Apr 04, 2025   0115 CT remarkable for multifocal pneumonia, she has been covered with Vanco azithromycin and cefepime as she was recently hospitalized.  The patient noted to have A-fib RVR gave her a touch of diltiazem but otherwise she has been hemodynamically stable.  Lungs open up well with 3 breathing treatments and now satting in the low 90s on 6 L up from her baseline of 2 L home O2 [AK]      ED Course User Index  [AK] Kory Kim MD                                                       Medical Decision Making  74-year-old female here for shortness of breath history potentially concerning for aspiration pneumonia given memory impairment and excessive sleepiness but patient denied any aspiration events.  Noted multifocal pneumonia which is not consistent with aspiration pneumonia covered for hospital-acquired pneumonia given recent hospitalization.  She was A-fib RVR but hemodynamically is stable the entire time so that she was given 10 mg of diltiazem.  I was careful due to patient having bilateral lower extremity pitting edema.  After the diltiazem, her pressure was remaining stable.  At the time of admission she was on 6 L via nasal cannula, up from her home 2 L via nasal cannula.    Problems Addressed:  Acute hypoxemic respiratory failure: complicated acute illness or injury  Atrial fibrillation with rapid ventricular response: complicated acute illness or injury  Multifocal pneumonia: complicated acute illness or injury  Sepsis with acute organ dysfunction without septic shock, due to unspecified organism, unspecified organ dysfunction type: complicated acute illness or injury    Amount and/or Complexity of Data Reviewed  Labs: ordered.  Radiology: ordered.  ECG/medicine tests: ordered.    Risk  Prescription drug  management.  Decision regarding hospitalization.        Final diagnoses:   Multifocal pneumonia   Sepsis with acute organ dysfunction without septic shock, due to unspecified organism, unspecified organ dysfunction type   Atrial fibrillation with rapid ventricular response   Acute hypoxemic respiratory failure       ED Disposition  ED Disposition       ED Disposition   Decision to Admit    Condition   --    Comment   Level of Care: Progressive Care [20]   Diagnosis: Pneumonia [857791]   Admitting Physician: ANGELO BETHEA [[id number]]   Certification: I Certify That Inpatient Hospital Services Are Medically Necessary For Greater Than 2 Midnights                 No follow-up provider specified.       Medication List      No changes were made to your prescriptions during this visit.            Kory Kim MD  04/04/25 0119       Kory Kim MD  04/04/25 0122

## 2025-04-04 NOTE — PLAN OF CARE
Goal Outcome Evaluation:  Plan of Care Reviewed With: patient           Outcome Evaluation: PT Evaluation Complete: Patient performs supine to sit transfer with CGA, sit to/from stand transfers with CGA, and gait x 40 feet with CGA with use of FWW. Patient manages device safely with no loss of balance however fatigues very quickly with increased work of breathing. O2 sats 94-97% throughout on 3.5L O2/NC. Patient would benefit from physical therapy to progress mobility as tolerated. Plan to see 1-2 additional visits. Patient plans to return home with spouse at discharge. Recommend home health PT.    Anticipated Discharge Disposition (PT): home with home health

## 2025-04-04 NOTE — THERAPY EVALUATION
Patient Name: Nikki Merino  : 1950    MRN: [id number]                              Today's Date: 2025       Admit Date: 4/3/2025    Visit Dx:     ICD-10-CM ICD-9-CM   1. Multifocal pneumonia  J18.9 486   2. Sepsis with acute organ dysfunction without septic shock, due to unspecified organism, unspecified organ dysfunction type  A41.9 038.9    R65.20 995.92   3. Atrial fibrillation with rapid ventricular response  I48.91 427.31   4. Acute hypoxemic respiratory failure  J96.01 518.81     Patient Active Problem List   Diagnosis    Sepsis due to pneumonia    Acute hypoxic respiratory failure    Bronchiectasis    Chronic atrial fibrillation with RVR    HTN (hypertension)    HLD (hyperlipidemia)     Past Medical History:   Diagnosis Date    Atrial fibrillation     COPD (chronic obstructive pulmonary disease)     use 2 LPM NC    Diabetes mellitus     GERD (gastroesophageal reflux disease)     Hypertension     Urinary tract infection      Past Surgical History:   Procedure Laterality Date    LUNG REMOVAL, PARTIAL      LEFT LOWER LUNG      General Information       Row Name 25 1115          Physical Therapy Time and Intention    Document Type evaluation  -BP     Mode of Treatment physical therapy  -BP       Row Name 25 1115          General Information    Patient Profile Reviewed yes  Patient presented due to SOA. Admitted for treatment acute hypoxic respiratory failure. On 2L O21/NC at baseline. Admitted two months ago for PNA.  -BP     Prior Level of Function --  Per patient and family she is independent with mobility within home with use of rollator. She reports independence with ADLs. She is mostly sedentary and sleeps in a recliner. She is able to ambulate household distances.  -BP     Existing Precautions/Restrictions fall;oxygen therapy device and L/min  currently on 3.5L O2/NC  -BP     Barriers to Rehab previous functional deficit  -BP       Row Name 25 1111          Living Environment     Current Living Arrangements home  -BP     People in Home spouse  -BP       Row Name 04/04/25 1115          Home Main Entrance    Number of Stairs, Main Entrance --  Ramp to enter  -BP       Row Name 04/04/25 1115          Stairs Within Home, Primary    Stairs, Within Home, Primary One level home  -BP       Row Name 04/04/25 1115          Cognition    Orientation Status (Cognition) oriented x 3  -BP       Row Name 04/04/25 1115          Safety Issues/Impairments Affecting Functional Mobility    Comment, Safety Issues/Impairments (Mobility) WFL  -BP               User Key  (r) = Recorded By, (t) = Taken By, (c) = Cosigned By      Initials Name Provider Type    Young Avery, PT Physical Therapist                   Mobility       Row Name 04/04/25 1223          Bed Mobility    Bed Mobility supine-sit  -BP     Supine-Sit [place] (Bed Mobility) contact guard;verbal cues  -BP     Assistive Device (Bed Mobility) head of bed elevated;bed rails  -BP       Row Name 04/04/25 1223          Transfers    Comment, (Transfers) Verbal cues for hand placement  -BP       Row Name 04/04/25 1223          Sit-Stand Transfer    Sit-Stand [place] (Transfers) contact guard;verbal cues  -BP     Assistive Device (Sit-Stand Transfers) walker, front-wheeled  -BP       Row Name 04/04/25 1223          Gait/Stairs (Locomotion)    [place] Level (Gait) contact guard;verbal cues  -BP     Assistive Device (Gait) walker, front-wheeled  -BP     Distance in Feet (Gait) 40  -BP     Deviations/Abnormal Patterns (Gait) stride length decreased;la decreased  -BP     Bilateral Gait Deviations forward flexed posture  -BP     Comment, (Gait/Stairs) Patient manages device safely, no loss of balance noted. Assist with O2 line only. Patient fatigues very quickly.  -BP               User Key  (r) = Recorded By, (t) = Taken By, (c) = Cosigned By      Initials Name Provider Type    Young Avery, PT Physical Therapist                    Obj/Interventions       Row Name 04/04/25 1227          Range of Motion Comprehensive    Comment, General Range of Motion B LE AROM WFL.  -BP       Row Name 04/04/25 1227          Strength Comprehensive (MMT)    Comment, General Manual Muscle Testing (MMT) Assessment B LE strength functional, not formerly assessed  -BP       Row Name 04/04/25 1227          Balance    Comment, Balance sitting balance-supervision. Standing balance-CGA with device  -BP               User Key  (r) = Recorded By, (t) = Taken By, (c) = Cosigned By      Initials Name Provider Type    BP Young Mejia, PT Physical Therapist                   Goals/Plan       Row Name 04/04/25 1239          Transfer Goal 1 (PT)    Activity/Assistive Device (Transfer Goal 1, PT) sit-to-stand/stand-to-sit;walker, rolling  -BP     Time Frame (Transfer Goal 1, PT) 3 days  -BP     Progress/Outcome (Transfer Goal 1, PT) new goal  -BP       [facility] Name 04/04/25 1239          Gait Training Goal 1 (PT)    Activity/Assistive Device (Gait Training Goal 1, PT) gait (walking locomotion);walker, rolling  -BP     [place] Level (Gait Training Goal 1, PT) supervision required  -BP     Distance (Gait Training Goal 1, PT) 50  -BP     Time Frame (Gait Training Goal 1, PT) 3 days  -BP     Progress/Outcome (Gait Training Goal 1, PT) new goal  -BP       [facility] Name 04/04/25 1239          Therapy Assessment/Plan (PT)    Planned Therapy Interventions (PT) gait training;patient/family education;transfer training  -BP               User Key  (r) = Recorded By, (t) = Taken By, (c) = Cosigned By      Initials Name Provider Type    BP Young Mejia, PT Physical Therapist                   Clinical Impression       Row Name 04/04/25 1228          Pain    Pretreatment Pain Rating 0/10 - no pain  -BP     Posttreatment Pain Rating 0/10 - no pain  -BP       [facility] Name 04/04/25 1228          Plan of Care Review    Plan of Care Reviewed With patient  -BP     Outcome Evaluation PT  Evaluation Complete: Patient performs supine to sit transfer with CGA, sit to/from stand transfers with CGA, and gait x 40 feet with CGA with use of FWW. Patient manages device safely with no loss of balance however fatigues very quickly with increased work of breathing. O2 sats 94-97% throughout on 3.5L O2/NC. Patient would benefit from physical therapy to progress mobility as tolerated. Plan to see 1-2 additional visits. Patient plans to return home with spouse at discharge. Recommend home health PT.  -BP       Row Name 04/04/25 1228          Therapy Assessment/Plan (PT)    Rehab Potential (PT) good  -BP     Criteria for Skilled Interventions Met (PT) yes;meets criteria  -BP     Therapy Frequency (PT) other (see comments)  1-2 visits  -BP       Row Name 04/04/25 1228          Vital Signs    Pre SpO2 (%) 97  -BP     O2 Delivery Pre Treatment supplemental O2  3.5L  -BP     Post SpO2 (%) 94  just post gait training  -BP     O2 Delivery Post Treatment supplemental O2  3.5L  -BP       Row Name 04/04/25 1228          Positioning and Restraints    Pre-Treatment Position in bed  -BP     Post Treatment Position chair  -BP     In Chair notified nsg;reclined;call light within reach;encouraged to call for assist  -BP               User Key  (r) = Recorded By, (t) = Taken By, (c) = Cosigned By      Initials Name Provider Type    BP Young Mejia, PT Physical Therapist                   Outcome Measures       Row Name 04/04/25 1241 04/04/25 0438       How much help from another person do you currently need...    Turning from your back to your side while in flat bed without using bedrails? 3  -BP 3  -AT    Moving from lying on back to sitting on the side of a flat bed without bedrails? 3  -BP 3  -AT    Moving to and from a bed to a chair (including a wheelchair)? 3  -BP 3  -AT    Standing up from a chair using your arms (e.g., wheelchair, bedside chair)? 3  -BP 2  -AT    Climbing 3-5 steps with a railing? 3  -BP 2  -AT    To  walk in hospital room? 3  -BP 2  -AT    AM-PAC 6 Clicks Score (PT) 18  -BP 15  -AT    Highest Level of Mobility Goal 6 --> Walk 10 steps or more  -BP 4 --> Transfer to chair/commode  -AT      Row Name 04/04/25 0310          How much help from another person do you currently need...    Turning from your back to your side while in flat bed without using bedrails? 3  -AT     Moving from lying on back to sitting on the side of a flat bed without bedrails? 3  -AT     Moving to and from a bed to a chair (including a wheelchair)? 3  -AT     Standing up from a chair using your arms (e.g., wheelchair, bedside chair)? 2  -AT     Climbing 3-5 steps with a railing? 2  -AT     To walk in hospital room? 2  -AT     AM-PAC 6 Clicks Score (PT) 15  -AT     Highest Level of Mobility Goal 4 --> Transfer to chair/commode  -AT       Row Name 04/04/25 1241          Functional Assessment    Outcome Measure Options AM-[facility] 6 Clicks Basic Mobility (PT)  -               User Key  (r) = Recorded By, (t) = Taken By, (c) = Cosigned By      Initials Name Provider Type    BP Young Mejia, PT Physical Therapist    AT John Bang RN Registered Nurse                                 Physical Therapy Education       Title: PT OT SLP Therapies (Done)       Topic: Physical Therapy (Done)       Point: Mobility training (Done)       Learning Progress Summary            Patient Acceptance, E,TB, VU by BP at 4/4/2025 1241                                      User Key       Initials Effective Dates Name Provider Type Discipline     06/16/21 -  Young Mejia, PT Physical Therapist PT                  PT Recommendation and Plan  Planned Therapy Interventions (PT): gait training, patient/family education, transfer training  Outcome Evaluation: PT Evaluation Complete: Patient performs supine to sit transfer with CGA, sit to/from stand transfers with CGA, and gait x 40 feet with CGA with use of FWW. Patient manages device safely with no loss of  balance however fatigues very quickly with increased work of breathing. O2 sats 94-97% throughout on 3.5L O2/NC. Patient would benefit from physical therapy to progress mobility as tolerated. Plan to see 1-2 additional visits. Patient plans to return home with spouse at discharge. Recommend home health PT.     Time Calculation:   PT Evaluation Complexity  History, PT Evaluation Complexity: 3 or more personal factors and/or comorbidities  Examination of Body Systems (PT Eval Complexity): 1-2 elements  Clinical Presentation (PT Evaluation Complexity): stable  Clinical Decision Making (PT Evaluation Complexity): low complexity  Overall Complexity (PT Evaluation Complexity): low complexity     PT Charges       Row Name 04/04/25 1242             Time Calculation    Start Time 1020  -BP      Stop Time 1045  -BP      Time Calculation (min) 25 min  -BP      PT Received On 04/04/25  -BP      PT - Next Appointment 04/05/25  -BP                User Key  (r) = Recorded By, (t) = Taken By, (c) = Cosigned By      Initials Name Provider Type    BP Young Mejia, PT Physical Therapist                  Therapy Charges for Today       Code Description Service Date Service Provider Modifiers Qty    08946842864 HC PT EVAL LOW COMPLEXITY 2 4/4/2025 Young Mejia, PT GP 1            PT G-Codes  Outcome Measure Options: AM-PAC 6 Clicks Basic Mobility (PT)  AM-PAC 6 Clicks Score (PT): 18  PT Discharge Summary  Anticipated Discharge Disposition (PT): home with home health    Young Mejia, PT  4/4/2025

## 2025-04-04 NOTE — PLAN OF CARE
Goal Outcome Evaluation:  Plan of Care Reviewed With: patient        Progress: no change  Outcome Evaluation: ED admit: Patient is alert and oriented, admitted with complaint of SOA, 2 LPM NC at baseline, currently on 6 LPM with saturation at 94 to 96%, RR at 24 breath/min, O2 supplement decreased to 4 LPM. Redness underbreast and skin folds noted. Swabbed for MRSA, awaiting result. Afib on telemetry with HR ranging from 90s to 120s, no complaint of chest pain/discomfort.

## 2025-04-04 NOTE — PLAN OF CARE
Goal Outcome Evaluation:  Plan of Care Reviewed With: patient, spouse, caregiver, child        Progress: improving  Outcome Evaluation: Pt VSS, continues tele, afib HR 80's-90's, O2@3L, RT titrating, see RT charting.  Pt reports pain improved with current regimen, see emar, flowsheets.  Pt denies n/v/d, reports tolerating diet. IV antibiotics.  Blood cultures pending.  Wound RN consult, see notes, powders and creams apply as ordered.  Pt working with PT, OT today, up in chair for meals; up with assist x1-2, walker.  Family at bedside.  Pt resting at this time.

## 2025-04-04 NOTE — PLAN OF CARE
Goal Outcome Evaluation:  Plan of Care Reviewed With: patient, spouse, daughter, family           Outcome Evaluation: Occupational therapy. Education provided regarding energy conservation principles and use of long handled adaptive equipment during adl routine. Pt demonstrated use of LH reacher and sock aid to don/doff socks. Plan is to return home with family support. Rec  services at discharge.    Anticipated Discharge Disposition (OT): home with assist, home with home health

## 2025-04-04 NOTE — H&P
Patient Name:  Nikki Merino  YOB: 1950  MRN:  [id number]  Admit Date:  4/3/2025  Patient Care Team:  Francoise Daly MD as PCP - General (Family Medicine)      Subjective   History Present Illness     Chief Complaint   Patient presents with    Shortness of Breath     Xdays, productive cough, recent admitted for PNA       History of Present Illness  Ms. Merino is a 74 y.o. possible history of chronic respiratory failure on 2 L , bronchiectasis, chronic atrial fibrillation on warfarin, hyperlipidemia, hypertension, memory impairment presenting from home with shortness of breath discotomy progressively worse over the last few days with associated increased work of breathing, sputum production, nausea without vomiting.  Patient's had multiple UTIs recently.  No dysuria or frequency at this time.    Recently admitted at [facility] from 1/11 on 1/13 also found to have pneumonia then.  Was discharged home with home health.    Review of Systems   Constitutional:  Positive for fatigue. Negative for chills and fever.   Respiratory:  Positive for cough and shortness of breath.    Cardiovascular:  Negative for chest pain and leg swelling.   Gastrointestinal:  Negative for abdominal pain, diarrhea and nausea.        Personal History     No past medical history on file.  Past Surgical History:   Procedure Laterality Date    LUNG REMOVAL, PARTIAL      LEFT LOWER LUNG     Family History   Problem Relation Age of Onset    Heart disease Mother     Leukemia Mother     Stomach cancer Father      Social History     Tobacco Use    Smoking status: Never    Tobacco comments:     EXPOSED TO 2ND HAND SMOKE   Substance Use Topics    Alcohol use: No     No current facility-administered medications on file prior to encounter.     Current Outpatient Medications on File Prior to Encounter   Medication Sig Dispense Refill    acetaminophen (TYLENOL) 500 MG tablet Take 1 tablet by mouth Every 6 (Six) Hours As Needed for Mild Pain.       albuterol (PROVENTIL) (2.5 MG/3ML) 0.083% nebulizer solution Take 2.5 mg by nebulization Every 4 (Four) Hours As Needed for Wheezing.      atorvastatin (LIPITOR) 40 MG tablet Take 1 tablet by mouth Every Night.      lactulose (CHRONULAC) 10 GM/15ML solution Take 15 mL by mouth 3 (Three) Times a Day.      metoprolol succinate XL (TOPROL-XL) 25 MG 24 hr tablet Take 1 tablet by mouth Daily.      spironolactone (ALDACTONE) 100 MG tablet Take 1 tablet by mouth Daily.      warfarin (COUMADIN) 7.5 MG tablet Take 1 tablet by mouth Daily.      albuterol sulfate  (90 Base) MCG/ACT inhaler Inhale 2 puffs Every 4 (Four) Hours As Needed for Wheezing.      fluticasone (FLONASE) 50 MCG/ACT nasal spray 2 sprays into each nostril Daily.      LORazepam (ATIVAN) 0.5 MG tablet Take 0.5 mg by mouth Every 6 (Six) Hours As Needed for anxiety.      Spacer/Aero-Holding Chambers (OPTICHAMBER ADVANTAGE) misc       vitamin B-12 (CYANOCOBALAMIN) 100 MCG tablet Take 1 tablet by mouth Daily.      [DISCONTINUED] allopurinol (ZYLOPRIM) 100 MG tablet Take 100 mg by mouth Daily.      [DISCONTINUED] Fluticasone Furoate-Vilanterol 100-25 MCG/INH aerosol powder  Inhale.      [DISCONTINUED] furosemide (LASIX) 40 MG tablet Take 40 mg by mouth Daily.      [DISCONTINUED] losartan (COZAAR) 50 MG tablet Take 50 mg by mouth Daily.      [DISCONTINUED] metoprolol tartrate (LOPRESSOR) 50 MG tablet Take 1.5 tablets by mouth 2 (Two) Times a Day.      [DISCONTINUED] potassium chloride (K-DUR) 10 MEQ CR tablet Take 10 mEq by mouth Daily.      [DISCONTINUED] pravastatin (PRAVACHOL) 20 MG tablet Take 20 mg by mouth Daily.       Allergies   Allergen Reactions    Ciprofloxacin     Meperidine And Related     Penicillins        Objective    Objective     Vital Signs  Temp:  [99.1 °F (37.3 °C)] 99.1 °F (37.3 °C)  Heart Rate:  [105-141] 105  Resp:  [23-31] 26  BP: (108-145)/() 115/67  SpO2:  [89 %-97 %] 97 %  on  Flow (L/min) (Oxygen Therapy):  [6] 6;    Device (Oxygen Therapy): nasal cannula  Body mass index is 35.34 kg/m².    Physical Exam  Constitutional:       General: She is not in acute distress.     Appearance: She is ill-appearing.   Cardiovascular:      Rate and Rhythm: Tachycardia present. Rhythm irregular.   Pulmonary:      Effort: Respiratory distress present.      Breath sounds: Rhonchi present.   Abdominal:      General: Abdomen is flat. There is no distension.      Tenderness: There is no abdominal tenderness.   Musculoskeletal:         General: No swelling or deformity. Normal range of motion.   Skin:     General: Skin is warm and dry.   Neurological:      General: No focal deficit present.      Mental Status: She is alert and oriented to person, place, and time. Mental status is at baseline.         Results Review:  I reviewed the patient's new clinical results.  I reviewed the patient's new imaging results and agree with the interpretation.  I reviewed the patient's other test results and agree with the interpretation  I personally viewed and interpreted the patient's EKG/Telemetry data  Discussed with ED provider.    Lab Results (last 24 hours)       Procedure Component Value Units Date/Time    Comprehensive Metabolic Panel [[id number]]  (Abnormal) Collected: 04/03/25 2311    Specimen: Blood Updated: 04/03/25 2344     Glucose 177 mg/dL      BUN 10 mg/dL      Creatinine 0.90 mg/dL      Sodium 139 mmol/L      Potassium 4.2 mmol/L      Chloride 101 mmol/L      CO2 25.3 mmol/L      Calcium 10.6 mg/dL      Total Protein 7.9 g/dL      Albumin 3.7 g/dL      ALT (SGPT) 22 U/L      AST (SGOT) 26 U/L      Alkaline Phosphatase 112 U/L      Total Bilirubin 1.7 mg/dL      Globulin 4.2 gm/dL      A/G Ratio 0.9 g/dL      BUN/Creatinine Ratio 11.1     Anion Gap 12.7 mmol/L      eGFR 66.4 mL/min/1.73     Narrative:      GFR Categories in Chronic Kidney Disease (CKD)      GFR Category          GFR (mL/min/1.73)    Interpretation  G1                     90 or  greater         Normal or high (1)  G2                      60-89                Mild decrease (1)  G3a                   45-59                Mild to moderate decrease  G3b                   30-44                Moderate to severe decrease  G4                    15-29                Severe decrease  G5                    14 or less           Kidney failure          (1)In the absence of evidence of kidney disease, neither GFR category G1 or G2 fulfill the criteria for CKD.    eGFR calculation 2021 CKD-EPI creatinine equation, which does not include race as a factor    High Sensitivity Troponin T [[id number]]  (Abnormal) Collected: 04/03/25 2311    Specimen: Blood Updated: 04/03/25 2344     HS Troponin T 22 ng/L     Narrative:      High Sensitive Troponin T Reference Range:  <14.0 ng/L- Negative Female for AMI  <22.0 ng/L- Negative Male for AMI  >=14 - Abnormal Female indicating possible myocardial injury.  >=22 - Abnormal Male indicating possible myocardial injury.   Clinicians would have to utilize clinical acumen, EKG, Troponin, and serial changes to determine if it is an Acute Myocardial Infarction or myocardial injury due to an underlying chronic condition.         BNP [[id number]]  (Normal) Collected: 04/03/25 2311    Specimen: Blood Updated: 04/03/25 2344     proBNP 428.0 pg/mL     Narrative:      This assay is used as an aid in the diagnosis of individuals suspected of having heart failure. It can be used as an aid in the diagnosis of acute decompensated heart failure (ADHF) in patients presenting with signs and symptoms of ADHF to the emergency department (ED). In addition, NT-proBNP of <300 pg/mL indicates ADHF is not likely.    Age Range Result Interpretation  NT-proBNP Concentration (pg/mL:      <50             Positive            >450                   Gray                 300-450                    Negative             <300    50-75           Positive            >900                  [place]                 "300-900                  Negative            <300      >75             Positive            >1800                  Gray                300-1800                  Negative            <300    Protime-INR [[id number]]  (Abnormal) Collected: 04/03/25 2311    Specimen: Blood Updated: 04/03/25 2357     Protime 35.0 Seconds      INR 3.39    Narrative:      Therapeutic Ranges for INR: 2.0-3.0 (PT 20-30)                              2.5-3.5 (PT 25-34)    aPTT [[id number]]  (Abnormal) Collected: 04/03/25 2311    Specimen: Blood Updated: 04/03/25 2357     PTT 40.0 seconds     Narrative:      PTT = The equivalent PTT values for the therapeutic range of heparin levels at 0.1 to 0.7 U/ml are 53 to 110 seconds.      Lactic Acid, Plasma [[id number]]  (Normal) Collected: 04/03/25 2311    Specimen: Blood Updated: 04/03/25 2337     Lactate 1.7 mmol/L     Procalcitonin [[id number]]  (Normal) Collected: 04/03/25 2311    Specimen: Blood Updated: 04/03/25 2348     Procalcitonin 0.25 ng/mL     Narrative:      As a Marker for Sepsis (Non-Neonates):    1. <0.5 ng/mL represents a low risk of severe sepsis and/or septic shock.  2. >2 ng/mL represents a high risk of severe sepsis and/or septic shock.    As a Marker for Lower Respiratory Tract Infections that require antibiotic therapy:    PCT on Admission    Antibiotic Therapy       6-12 Hrs later    >0.5                Strongly Recommended  >0.25 - <0.5        Recommended   0.1 - 0.25          Discouraged              Remeasure/reassess PCT  <0.1                Strongly Discouraged     Remeasure/reassess PCT    As 28 day mortality risk marker: \"Change in Procalcitonin Result\" (>80% or <=80%) if Day 0 (or Day 1) and Day 4 values are available. Refer to http://www.[employer]-pct-calculator.com    Change in PCT <=80%  A decrease of PCT levels below or equal to 80% defines a positive change in PCT test result representing a higher risk for 28-day all-cause mortality of patients diagnosed with severe sepsis " for septic shock.    Change in PCT >80%  A decrease of PCT levels of more than 80% defines a negative change in PCT result representing a lower risk for 28-day all-cause mortality of patients diagnosed with severe sepsis or septic shock.       Blood Culture - Blood, Arm, Right [[id number]] Collected: 04/03/25 2311    Specimen: Blood from Arm, Right Updated: 04/03/25 2316    Blood Culture - Blood, Arm, Left [[id number]] Collected: 04/03/25 2311    Specimen: Blood from Arm, Left Updated: 04/03/25 2316    Urinalysis With Microscopic If Indicated (No Culture) - Urine, Catheter [[id number]]  (Abnormal) Collected: 04/03/25 2312    Specimen: Urine, Catheter Updated: 04/03/25 2355     Color, UA Dark Yellow     Appearance, UA Clear     pH, UA 7.0     Specific S[place], UA 1.026     Comment: Result obtained by Refractometer        Glucose, UA Negative     Ketones, UA Negative     Bilirubin, UA Negative     Blood, UA Moderate (2+)     Protein, UA >=300 mg/dL (3+)     Leuk Esterase, UA Trace     Nitrite, UA Negative     Urobilinogen, UA 2.0 E.U./dL    Urinalysis, Microscopic Only - Urine, Catheter [[id number]]  (Abnormal) Collected: 04/03/25 2312    Specimen: Urine, Catheter Updated: 04/04/25 0006     RBC, UA 11-20 /HPF      WBC, UA 3-5 /HPF      Bacteria, UA Trace /HPF      Squamous Epithelial Cells, UA 7-12 /HPF      Transitional Epithelial Cells, UA 0-2 /HPF      Hyaline Casts, UA 0-2 /LPF      RBC Casts 0-2 /LPF      Methodology Manual Light Microscopy    CBC & Differential [[id number]]  (Abnormal) Collected: 04/03/25 2351    Specimen: Blood Updated: 04/03/25 2357    Narrative:      The following orders were created for panel order CBC & Differential.  Procedure                               Abnormality         Status                     ---------                               -----------         ------                     CBC Auto Differential[[id number]]        Abnormal            Final result                 Please view results  for these tests on the individual orders.    CBC Auto Differential [[id number]]  (Abnormal) Collected: 04/03/25 2351    Specimen: Blood Updated: 04/03/25 2357     WBC 19.62 10*3/mm3      RBC 5.04 10*6/mm3      Hemoglobin 15.5 g/dL      Hematocrit 47.8 %      MCV 94.8 fL      MCH 30.8 pg      MCHC 32.4 g/dL      RDW 14.1 %      RDW-SD 49.0 fl      MPV 9.3 fL      Platelets 291 10*3/mm3      Neutrophil % 87.4 %      Lymphocyte % 4.5 %      Monocyte % 7.4 %      Eosinophil % 0.0 %      Basophil % 0.2 %      Immature Grans % 0.5 %      Neutrophils, Absolute 17.15 10*3/mm3      Lymphocytes, Absolute 0.88 10*3/mm3      Monocytes, Absolute 1.45 10*3/mm3      Eosinophils, Absolute 0.00 10*3/mm3      Basophils, Absolute 0.04 10*3/mm3      Immature Grans, Absolute 0.10 10*3/mm3      nRBC 0.0 /100 WBC     Blood Gas, Arterial - [[id number]]  (Abnormal) Collected: 04/03/25 2352    Specimen: Arterial Blood Updated: 04/03/25 2358     Site Right Brachial     Ld's Test N/A     pH, Arterial 7.319 pH units      Comment: 84 Value below reference range        pCO2, Arterial 50.8 mm Hg      Comment: 83 Value above reference range        pO2, Arterial 146.0 mm Hg      Comment: 83 Value above reference range        HCO3, Arterial 26.1 mmol/L      Comment: 83 Value above reference range        Base Excess, Arterial -1.0 mmol/L      Comment: 84 Value below reference range        O2 Saturation, Arterial 98.1 %      Hemoglobin, Blood Gas 17.0 g/dL      Temperature 37.0     Barometric Pressure for Blood Gas 744 mmHg      Modality Nasal Cannula     Flow Rate 6.0 lpm      Ventilator Mode --     Collected by [id number]     Comment: Meter: [id number]     :  [id number]        pCO2, Temperature Corrected 50.8 mm Hg      pH, Temp Corrected 7.319 pH Units      pO2, Temperature Corrected 146 mm Hg     High Sensitivity Troponin T 1Hr [[id number]]  (Abnormal) Collected: 04/04/25 0055    Specimen: Blood Updated: 04/04/25 0116     HS Troponin T 25  ng/L      Troponin T Numeric Delta 3 ng/L      Troponin T % Delta 14    Narrative:      High Sensitive Troponin T Reference Range:  <14.0 ng/L- Negative Female for AMI  <22.0 ng/L- Negative Male for AMI  >=14 - Abnormal Female indicating possible myocardial injury.  >=22 - Abnormal Male indicating possible myocardial injury.   Clinicians would have to utilize clinical acumen, EKG, Troponin, and serial changes to determine if it is an Acute Myocardial Infarction or myocardial injury due to an underlying chronic condition.                 Imaging Results (Last 24 Hours)       Procedure Component Value Units Date/Time    CT Angiogram Chest [[id number]] Collected: 04/04/25 0059     Updated: 04/04/25 0108    Narrative:      CT ANGIOGRAM CHEST    Date of Exam: 4/4/2025 12:40 AM EDT    Indication: likely pneumonia Lll, r/o PE.    Comparison: None available.    Technique: CTA of the chest was performed before and after the uneventful intravenous administration of iodinated contrast. Reconstructed coronal and sagittal images were also obtained. In addition, a 3-D volume rendered image was created for   interpretation. Automated exposure control and iterative reconstruction methods were used.    Findings:  Pulmonary arteries: Adequate opacification of the pulmonary arteries. No evidence of acute pulmonary embolism.    Lungs and Pleura: There is mild left lower lobe airspace disease. There is right middle lobe and right lower lobe airspace disease with air bronchograms and nodular opacities consistent with multifocal pneumonia.    Mediastinum/Shonda: No mediastinal or hilar lymphadenopathy.    Lymph nodes: No axillary or supraclavicular adenopathy.    Cardiovascular: The cardiac chambers are within normal limits. The pericardium is normal. The aorta and its arch branch vessels are unremarkable.       Upper Abdomen: There is a nonobstructing stone of the left kidney upper pole.    Bones and Soft Tissue: No suspicious osseous  lesion. There is a rounded mass in the right breast measuring 7.1 x 5.6 x 6.5 cm. The patient does not have a history of mammography at this institution. Relation with any prior mammographic evaluation would   be recommended. If there is not been recent mammogram or ultrasound, further diagnostic right breast evaluation would be recommended to ensure there is no underlying mass lesion.        Impression:      Impression:  1.No evidence of pulmonary embolism.  2.Multifocal pneumonia.  3.7.1 x 5.6 x 6.5 cm right breast mass. Correlation with any prior mammographic evaluation would be recommended. If there is not been recent mammogram or ultrasound at an outside facility, further diagnostic right breast evaluation would be recommended   to ensure there is no underlying mass lesion.            Electronically Signed: Alvaro Owens MD    4/4/2025 1:05 AM EDT    Workstation ID: [id number]    XR Chest 1 View [[id number]] Collected: 04/03/25 2305     Updated: 04/03/25 2310    Narrative:      XR CHEST 1 VW    Date of Exam: 4/3/2025 10:47 PM EDT    Indication: Chest Pain Protocol  Chest Pain Protocol    Comparison: None available.    Findings:  The heart is enlarged. The pulmonary vascular markings are normal. There are small bilateral pleural effusions. There is bilateral basilar atelectasis. There is no pneumothorax. The osseous structures are normal.      Impression:      Impression:  Cardiomegaly. Small bilateral pleural effusions. Bibasilar atelectasis.          Electronically Signed: Alvaro Owens MD    4/3/2025 11:06 PM EDT    Workstation ID: [id number]                ECG 12 Lead Chest Pain   Preliminary Result   HEART FMGK=266  bpm   RR Wwvzzuro=098  ms   IA Interval=  ms   P Horizontal Axis=  deg   P Front Axis=  deg   QRSD Interval=80  ms   QT Ttwbcpqa=227  ms   SRpT=520  ms   QRS Axis=25  deg   T Wave Axis=-41  deg   - ABNORMAL ECG -   Atrial fibrillation   Low voltage, precordial leads   Date and Time of Study:2025-04-03  22:59:48           Assessment/Plan     Active Hospital Problems    Diagnosis  POA    **Sepsis due to pneumonia [J18.9, A41.9]  Yes    Acute hypoxic respiratory failure [J96.01]  Yes    Bronchiectasis [J47.9]  Yes    Chronic atrial fibrillation with RVR [I48.20]  Yes    HTN (hypertension) [I10]  Yes    HLD (hyperlipidemia) [E78.5]  Yes      Resolved Hospital Problems   No resolved problems to display.     Acute on chronic hypoxic respiratory failure 2L at baseline  Multifocal pneumonia with sepsis  Bronchiectasis  -Small bolus given in the emergency room but not full sepsis bolus because of her lower extremity swelling  -Sputum culture ordered, blood cultures pending  -Continue vancomycin and cefepime for now, MRSA nares pending  -With recurrent pneumonia's, will have speech evaluate  -Pulmonology consulted     Chronic atrial fibrillation with RVR  Hyperlipidemia  Hypertension  - INR slightly above goal.  Patient takes warfarin 7.5 mg daily.  Pharmacy to dose warfarin  -Continue atorvastatin. 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  Patient had previously not wanted any intervention and confirms that she still does not want intervention    Elevated ammonia  -Daughter at bedside showed me a recent lab with ammonia level of 109, patient was not confused at the appointment that this ammonia level was drawn, she was subsequently started on lactulose  -Elevated bilirubin but normal AST/ALT  -Check Liver US  -Recheck ammonia in the morning, continue lactulose for now          I discussed the patient's findings and my recommendations with patient, family, nursing staff, and ED provider.    VTE Prophylaxis - Warfarin (home med).  Code Status - Limited code (no CPR, no intubation).  Discussed with patient, , 2 daughters at bedside.       Tristin Galarza MD  [facility]  04/04/25  02:04 EDT

## 2025-04-04 NOTE — PROGRESS NOTES
[facility] Clinical Pharmacy Services: Cefepime Consult    Pt Name: Nikki Merino   : 1950  Weight: 93.4 kg (205 lb 14.4 oz)  Antibiotic: Cefepime  Indication: PNA    Relevant clinical data and objective history reviewed:    No past medical history on file.  Creatinine   Date Value Ref Range Status   2025 0.90 0.57 - 1.00 mg/dL Final   10/03/2014 0.65 0.57 - 1.00 mg/dL Final   10/02/2014 0.69 0.57 - 1.00 mg/dL Final   10/01/2014 0.76 0.57 - 1.00 mg/dL Final     BUN   Date Value Ref Range Status   2025 10 8 - 23 mg/dL Final     Estimated Creatinine Clearance: 58.9 mL/min (by C-G formula based on SCr of 0.9 mg/dL).    Lab Results   Component Value Date    WBC 19.62 (H) 2025     Temp Readings from Last 3 Encounters:   25 99.1 °F (37.3 °C) (Oral)      Assessment/Plan    Ordered Cefepime 2 Gm iv every 12 hours for a total of 5 days. Will monitor and adjust if culture data or pertinent lab values indicate this is best for the patient.     Thank you for this consult and please contact pharmacy with any questions or concerns.     Prudencio Stover [facility]  Clinical Pharmacist

## 2025-04-04 NOTE — PLAN OF CARE
Biometrics completed.     Results reviewed with a Registered Nurse; understanding of results and educational materials was verbalized.   Goal Outcome Evaluation:  Plan of Care Reviewed With: patient, spouse, daughter           Outcome Evaluation: Occupational thearpy evaluation completed. Patient presented to ER due to SOA. Dx of acute hypoxic respiratory failure. On 2L O21/NC at baseline. Pt currently on 3.5 L 02/NC. Pt lives with spouse and ambulates short distances at home with a rollator and manages self care from chair level. Pt reports feeling much better than at time of admittance, yet she still c/o shortness of air with limited activity. She managed bed mobility, transfers and mobility in room using a rolling walker with CGA. Pt currently needs min assist for adl's due to limited activity tolerance and SOA with activity. Rec OT services for education with energy conservation and use of adaptive equipment for adl management. Anticipate discharge to home with home health support.    Anticipated Discharge Disposition (OT): home with assist, home with home health

## 2025-04-04 NOTE — PROGRESS NOTES
"Pharmacy Antimicrobial Dosing Service    Subjective:  Nikki Merino is a 74 y.o.female admitted with PNA. Pharmacy has been consulted to dose Vancomycin and Cefepime for possible PNA.    PMH: Recent admission for [facility]      Assessment/Plan    1. Day #2 Vancomycin: Goal -600 mcg*h/mL. Patient received vancomycin 1750 mg (18 mg/kg ABW) loading dose. Tonight will initiate vancomycin 1500 mg (15 mg/kg ABW) IV q24h. Will obtain peak tonight at 2200 and trough tomorrow at 0800.     2. Day #2 Cefepime: 2 gm IV q12h for estCrCl 30-59 mL/min.    Will continue to monitor drug levels, renal function, culture and sensitivities, and patient clinical status.       Objective:  Relevant clinical data and objective history reviewed:  162.6 cm (64\")   94.1 kg (207 lb 7.3 oz)   Ideal body weight: 54.7 kg (120 lb 9.5 oz)  Adjusted ideal body weight: 70.5 kg (155 lb 5.4 oz)  Body mass index is 35.61 kg/m².        Results from last 7 days   Lab Units 04/03/25  2311   CREATININE mg/dL 0.90     Estimated Creatinine Clearance: 59.2 mL/min (by C-G formula based on SCr of 0.9 mg/dL).  No intake/output data recorded.    Results from last 7 days   Lab Units 04/04/25  0409 04/03/25  2351   WBC 10*3/mm3 22.97* 19.62*     Temperature    04/03/25 2245 04/04/25 0245   Temp: 99.1 °F (37.3 °C) 97 °F (36.1 °C)     Baseline culture/source/susceptibility:  Microbiology Results (last 10 days)       ** No results found for the last 240 hours. **            Mahi Chi, PharmD  04/04/25 06:50 EDT  " DISCHARGE

## 2025-04-04 NOTE — CONSULTS
[facility]  [phone]  Dr. Jean Pinto      Subjective   LOS: 0 days     This is a 74-year-old lady with history of chronic hypoxic respiratory failure on 2 L nasal cannula primarily just at night,  Bronchiectasis.  She presented to [facility] in the early morning of 4/4/2025 with shortness of breath and cough that have been going on for the last few days and getting progressively worse.  The cough was described as productive of sputum and she had increased work of breathing.  Of note she was recently admitted to [facility] from 1/11 to 1/13/2025 with pneumonia.  She had been treated with 7-day course of antibiotics with improvement and was discharged on doxycycline.  She states that she did improve from this however started to develop increasing shortness of breath and cough.  She was recently referred by her primary care doctor.  To a pulmonologist and was actually supposed to see Dr. Haas in our office in about a week.  Regarding her history of bronchiectasis, she states that this has been ever since she was a child and reportedly got a?  Lobectomy or some kind of resection within the left lower lobe in 1960.  Since that time she has just had bronchiectasis primarily in her right lower lobe.  She really has not experienced too many issues with this.  She does not have any chest vest at home and does not really do any airway clearance measures at home either.  She has a nebulizer and albuterol HFA.  She denies any trouble swallowing or choking after eating or drinking.  No other acute concerns or complaints.  She does know about a breast mass and states that she has had a phyllodes tumor for a long time but they have just watched.    Nikki Merino  reports no history of alcohol use.,  reports that she has never smoked. She does not have any smokeless tobacco history on file.     Past Hx:  has a past medical history of Atrial fibrillation, COPD (chronic  obstructive pulmonary disease), Diabetes mellitus, GERD (gastroesophageal reflux disease), Hypertension, and Urinary tract infection.  Surg Hx:  has a past surgical history that includes Lung removal, partial.  FH: family history includes Heart disease in her mother; Leukemia in her mother; Stomach cancer in her father.  SH:  reports that she has never smoked. She does not have any smokeless tobacco history on file. She reports that she does not drink alcohol and does not use drugs.    Medications Prior to Admission   Medication Sig Dispense Refill Last Dose/Taking    acetaminophen (TYLENOL) 500 MG tablet Take 1 tablet by mouth Every 6 (Six) Hours As Needed for Mild Pain.   4/3/2025    albuterol (PROVENTIL) (2.5 MG/3ML) 0.083% nebulizer solution Take 2.5 mg by nebulization Every 4 (Four) Hours As Needed for Wheezing.   4/3/2025    atorvastatin (LIPITOR) 40 MG tablet Take 1 tablet by mouth Every Night.   Past Week    lactulose (CHRONULAC) 10 GM/15ML solution Take 15 mL by mouth 3 (Three) Times a Day.   4/3/2025 Morning    LORazepam (ATIVAN) 0.5 MG tablet Take 1 tablet by mouth Every 6 (Six) Hours As Needed for Anxiety.   Past Week    metoprolol succinate XL (TOPROL-XL) 25 MG 24 hr tablet Take 1 tablet by mouth Daily.   4/4/2025 Morning    spironolactone (ALDACTONE) 100 MG tablet Take 1 tablet by mouth Daily.   4/3/2025    warfarin (COUMADIN) 7.5 MG tablet Take 1 tablet by mouth Daily.   4/3/2025 Morning    albuterol sulfate  (90 Base) MCG/ACT inhaler Inhale 2 puffs Every 4 (Four) Hours As Needed for Wheezing.       fluticasone (FLONASE) 50 MCG/ACT nasal spray 2 sprays into each nostril Daily.       Spacer/Aero-Holding Chambers (OPTICHAMBER ADVANTAGE) misc    Unknown    vitamin B-12 (CYANOCOBALAMIN) 100 MCG tablet Take 1 tablet by mouth Daily.        Allergies   Allergen Reactions    Ciprofloxacin     Meperidine And Related     Penicillins        Review of Systems   All other systems reviewed and are  negative.    Vital Signs past 24hrs  BP range: BP: (108-145)/() 130/83  Pulse range: Heart Rate:  [] 82  Resp rate range: Resp:  [20-31] 20  Temp range: Temp (24hrs), Av.1 °F (36.7 °C), Min:97 °F (36.1 °C), Max:99.1 °F (37.3 °C)    Oxygen range: SpO2:  [89 %-97 %] 96 %; Flow (L/min) (Oxygen Therapy):  [4-6] 4;   Device (Oxygen Therapy): nasal cannula  94.1 kg (207 lb 7.3 oz); Body mass index is 35.61 kg/m².  Net IO Since Admission: 1,590 mL [25 0911]        Mechanical Ventilator:     Physical Exam  Vitals reviewed.   Constitutional:       General: She is not in acute distress.     Appearance: Normal appearance.   HENT:      Head: Normocephalic and atraumatic.   Eyes:      Extraocular Movements: Extraocular movements intact.      Conjunctiva/sclera: Conjunctivae normal.      Pupils: Pupils are equal, round, and reactive to light.   Cardiovascular:      Rate and Rhythm: Normal rate and regular rhythm.      Heart sounds: No murmur heard.     No friction rub. No gallop.   Pulmonary:      Effort: Pulmonary effort is normal. No respiratory distress.      Breath sounds: Decreased air movement present. Examination of the right-upper field reveals rhonchi. Examination of the right-middle field reveals rhonchi. Examination of the right-lower field reveals rhonchi. Decreased breath sounds and rhonchi present. No wheezing or rales.   Abdominal:      General: Abdomen is flat.      Palpations: Abdomen is soft.      Tenderness: There is no abdominal tenderness.   Musculoskeletal:         General: No swelling or tenderness. Normal range of motion.   Skin:     General: Skin is warm and dry.      Findings: No rash.   Neurological:      General: No focal deficit present.      Mental Status: She is alert and oriented to person, place, and time.   Psychiatric:         Mood and Affect: Mood normal.         Behavior: Behavior normal.         Results Review:    I have reviewed the laboratory and imaging data from  current admission. My annotations are as noted in assessment and plan.  Result Review:  I have personally reviewed the results from the time of this admission to 4/4/2025 09:11 EDT and agree with these findings:  [x]  Laboratory list / accordion  [x]  Microbiology  [x]  Radiology  [x]  EKG/Telemetry   [x]  Cardiology/Vascular   [x]  Pathology  [x]  Old records  []  Other:      Medication Review:  I have reviewed the current MAR. My annotations are as noted in assessment and plan.    Pharmacy to dose vancomycin,   Pharmacy to dose warfarin,   Pharmacy To Dose:,       Lines, Drains & Airways       Active LDAs       Name Placement date Placement time Site Days    Peripheral IV 04/03/25 2311 Anterior;Left;Proximal Forearm 04/03/25  2311  Forearm  less than 1    Peripheral IV 04/03/25 2325 Anterior;Right Forearm 04/03/25  2325  Forearm  less than 1    External Urinary Catheter 04/04/25  0336  --  less than 1                  Diet Orders (active) (From admission, onward)       Start     Ordered    04/04/25 0133  Diet: Diabetic; Consistent Carbohydrate; Fluid Consistency: Thin (IDDSI 0)  Diet Effective Now         04/04/25 0133                  No active isolations    Assessment  Multifocal pneumonia  Bronchiectasis  Acute on chronic hypoxic respiratory failure      Plan  Patient presented on4/4/2025 with shortness of breath and productive cough.  Found to have multifocal pneumonia.  Has history of bronchiectasis.  - Continue current antibiotics with antipseudomonal's  - Try to obtain respiratory sputum culture  - Continue airway clearance measures.  Patient declines chest vest at this time but will do flutter and incentive spirometer  - Continue nebulized bronchodilators  - Continue weaning oxygen for goal O2 saturation greater than 90%.  At baseline she wears 2 L nasal cannula at night  - Pulmonary to continue following.  Already has established appointment with Dr. Mello Haas who will be taking over service  tomorrow.    Electronically signed by Jean Pinto DO, 04/04/25, 9:11 AM EDT.      Part of this note may be an electronic transcription/translation of spoken language to printed text using the Dragon Dictation System.

## 2025-04-04 NOTE — PROGRESS NOTES
"Pharmacy dosing service  Anticoagulant  Warfarin     Subjective:    Nikki Merino is a 74 y.o.female being continued on warfarin for Atrial Fibrillation / Flutter.    INR Goal: 2 - 3  Home medication?: warfarin 7.5 mg PO Sun, Tues, Thur, Fri, warfarin 5 mg PO on Mon, Wed, Fri.   Bridge Therapy Present?:  No  Interacting Medications Evaluation (New/Present/Discontinued): Cefepime (may increase INR)  Additional Contributing Factors: Acute illness      Assessment/Plan:    INR supratherapuetic today. Will hold at least 1 dose of warfarin and consider ~10% dose decrease.    Continue to monitor and adjust based on INR.         Date 4/4           INR 3.54           Dose HOLD               Objective:  [Ht: 162.6 cm (64\"); Wt: 94.1 kg (207 lb 7.3 oz); BMI: Body mass index is 35.61 kg/m².]    Lab Results   Component Value Date    ALBUMIN 3.7 04/03/2025     Lab Results   Component Value Date    INR 3.54 (H) 04/04/2025    INR 3.39 (H) 04/03/2025    PROTIME 36.2 (H) 04/04/2025    PROTIME 35.0 (H) 04/03/2025     Lab Results   Component Value Date    HGB 16.4 (H) 04/04/2025    HGB 15.5 04/03/2025    HGB 13.6 10/02/2014     Lab Results   Component Value Date    HCT 49.9 (H) 04/04/2025    HCT 47.8 (H) 04/03/2025    HCT 42.3 10/02/2014       Mahi Chi, PharmNIRAJ  04/04/25 06:25 EDT   "

## 2025-04-04 NOTE — THERAPY DISCHARGE NOTE
Acute Care - Occupational Therapy Discharge  [facility]    Patient Name: Nikki Merino  : 1950    MRN: [id number]                              Today's Date: 2025       Admit Date: 4/3/2025    Visit Dx:     ICD-10-CM ICD-9-CM   1. Multifocal pneumonia  J18.9 486   2. Sepsis with acute organ dysfunction without septic shock, due to unspecified organism, unspecified organ dysfunction type  A41.9 038.9    R65.20 995.92   3. Atrial fibrillation with rapid ventricular response  I48.91 427.31   4. Acute hypoxemic respiratory failure  J96.01 518.81     Patient Active Problem List   Diagnosis    Sepsis due to pneumonia    Acute hypoxic respiratory failure    Bronchiectasis    Chronic atrial fibrillation with RVR    HTN (hypertension)    HLD (hyperlipidemia)     Past Medical History:   Diagnosis Date    Atrial fibrillation     COPD (chronic obstructive pulmonary disease)     use 2 LPM NC    Diabetes mellitus     GERD (gastroesophageal reflux disease)     Hypertension     Urinary tract infection      Past Surgical History:   Procedure Laterality Date    LUNG REMOVAL, PARTIAL      LEFT LOWER LUNG      General Information       Row Name 25 1514 25 1358       OT Time and Intention    Subjective Information -- --  Pt reports feeling much better than at time of admittance.  -SD    Document Type discharge treatment  -SD evaluation  -SD    Mode of Treatment occupational therapy  -SD occupational therapy  -SD    Patient Effort -- good  -SD      Row Name 25 1514 25 1358       General Information    Patient Profile Reviewed -- yes  Patient presented due to SOA. Admitted for treatment acute hypoxic respiratory failure. On 2L O21/NC at baseline. Admitted two months ago for PNA. Pt lives with spouse and ambulates short distances at home with a rollator and manages self care.  -SD    Prior Level of Function -- --  Per patient and family she is independent with mobility within home with use of rollator.  She reports independence with ADLs from chair level. She is sedentary and sleeps in a recliner. She is able to ambulate household distances.  -SD    Existing Precautions/Restrictions fall;oxygen therapy device and L/min  3.5 L 02  -SD fall;oxygen therapy device and L/min  3.5 L 02  -SD    Barriers to Rehab -- previous functional deficit  -SD      Row Name 04/04/25 1358          Occupational Profile    Reason for Services/Referral (Occupational Profile) decreased adl function  -SD     Successful Occupations (Occupational Profile) I with basic adl's and mobility in home using a rollator  -SD     Occupational History/Life Experiences (Occupational Profile) Pt wears 02 at home. Pt with hx of partial left lung removal  -SD     Environmental Supports and Barriers (Occupational Profile) spouse at home. familly supportive  -SD     Patient Goals (Occupational Profile) return home at discharge  -SD       Row Name 04/04/25 1358          Living Environment    Current Living Arrangements home  -SD     People in Home spouse  -SD       Row Name 04/04/25 1358          Home Main Entrance    Number of Stairs, Main Entrance other (see comments)  ramp to enter home  -SD       Row Name 04/04/25 1358          Stairs Within Home, Primary    Stairs, Within Home, Primary one level home  -SD       Row Name 04/04/25 1358          Cognition    Orientation Status (Cognition) oriented x 3  -SD               User Key  (r) = Recorded By, (t) = Taken By, (c) = Cosigned By      Initials Name Provider Type    SD Mello Oviedo OTR Occupational Therapist                   Mobility/ADL's       Row Name 04/04/25 1401          Bed Mobility    Bed Mobility supine-sit  -SD     Supine-Sit Dukes (Bed Mobility) contact guard;verbal cues  -SD     Assistive Device (Bed Mobility) head of bed elevated;bed rails  -SD       Row Name 04/04/25 1403          Sit-Stand Transfer    Sit-Stand Dukes (Transfers) contact guard;verbal cues  -SD      Assistive Device (Sit-Stand Transfers) walker, front-wheeled  -SD       Row Name 04/04/25 1403          Functional Mobility    Functional Mobility- Ind. Level contact guard assist  -SD     Functional Mobility- Device walker, front-wheeled  -SD     Functional Mobility-Distance (Feet) 40  -SD       Row Name 04/04/25 1403          Activities of Daily Living    BADL Assessment/Intervention other (see comments);bathing;lower body dressing  Pt currently needs min assist for adl's due to limited activity tolerance and SOA with activity. Rec education with energy conservation and use of adaptive equipment for adl management.  -SD       Row Name 04/04/25 1515 04/04/25 1403       Bathing Assessment/Intervention    [place] Level (Bathing) -- bathing skills;lower body;minimum assist (75% patient effort)  -SD    Assistive Devices (Bathing) long-handled sponge  -SD --    Comment, (Bathing) Education provided regarding EC/WS principles and use of LH AE for lb adl activity. Pt provided with LH sponge  -SD --      Row Name 04/04/25 1515 04/04/25 1403       Lower Body Dressing Assessment/Training    [place] Level (Lower Body Dressing) lower body dressing skills;doff;don;socks;modified independence  -SD lower body dressing skills;minimum assist (75% patient effort)  -SD    Position (Lower Body Dressing) supported sitting  -SD --    Comment, (Lower Body Dressing) Education provided regarding EC/WS principles and use of LH AE for lb adl activity. Pt provided with LH reacher, LH shoe horn and sock aide. Pt demonstrated use of reacher and sock aide to doff/don socks.  -SD --              User Key  (r) = Recorded By, (t) = Taken By, (c) = Cosigned By      Initials Name Provider Type    SD Mello O[place], OTR Occupational Therapist                   Obj/Interventions       Row Name 04/04/25 1407          Range of Motion Comprehensive    Comment, General Range of Motion BUE rom functional for tasks during evaluation  -SD        Row Name 04/04/25 1407          Strength Comprehensive (MMT)    Comment, General Manual Muscle Testing (MMT) Assessment BUE strength functional for tasks during evaluation  -SD       Row Name 04/04/25 1407          Balance    Comment, Balance supervision for sitting balance and CGA for standing balance using a rolling walker for support  -SD               User Key  (r) = Recorded By, (t) = Taken By, (c) = Cosigned By      Initials Name Provider Type    SD Mello Oviedo OTR Occupational Therapist                   Goals/Plan       Row Name 04/04/25 1520 04/04/25 1412       Dressing Goal 1 (OT)    Activity/Device (Dressing Goal 1, OT) lower body dressing;other (see comments)  -SD lower body dressing;other (see comments)  using AE as needed  -SD    [place]/Cues Needed (Dressing Goal 1, OT) modified independence  -SD modified independence  -SD    Time Frame (Dressing Goal 1, OT) by discharge  -SD by discharge  -SD    Strategies/Barriers (Dressing Goal 1, OT) Education with EC/WS principles and use of LH AE  -SD Education with EC/WS principles and use of LH AE  -SD    Progress/Outcome (Dressing Goal 1, OT) goal met  pt provided with LH AE (reacher, shoe horn, sock aide and sponge)  -SD new goal  -SD      Row Name 04/04/25 1520 04/04/25 1412       Problem Specific Goal 1 (OT)    Problem Specific Goal 1 (OT) Pt to verbalize 2 EC/WS principle to incorporate during adl routine.  -SD Pt to verbalize 2 EC/WS principle to incorporate during adl routine.  -SD    Time Frame (Problem Specific Goal 1, OT) by discharge  -SD by discharge  -SD    Strategies/Barriers (Problem Specific Goal 1, OT) Education with EC/WS principle and use of LH AE  -SD Education with EC/WS principle and use of LH AE  -SD    Progress/Outcome (Problem Specific Goal 1, OT) goal met  pt verbalized sit for tasks, take breaks, use LH AE and get assistance as needed as EC/WS principles.  -SD new goal  -SD      Row Name 04/04/25 1520 04/04/25 1412        Therapy Assessment/Plan (OT)    Planned Therapy Interventions (OT) patient/caregiver education/training;adaptive equipment training;BADL retraining  -SD adaptive equipment training;BADL retraining;patient/caregiver education/training  -SD              User Key  (r) = Recorded By, (t) = Taken By, (c) = Cosigned By      Initials Name Provider Type    Mello Young OTR Occupational Therapist                   Clinical Impression       Row Name 04/04/25 1517 04/04/25 1407       Pain Assessment    Pretreatment Pain Rating 0/10 - no pain  -SD 0/10 - no pain  -SD    Posttreatment Pain Rating 0/10 - no pain  -SD 0/10 - no pain  -SD      Row Name 04/04/25 1517 04/04/25 1402       Plan of Care Review    Plan of Care Reviewed With patient;spouse;daughter;family  -SD patient;spouse;daughter  -SD    Outcome Evaluation Occupational therapy. Education provided regarding energy conservation principles and use of long handled adaptive equipment during adl routine. Pt demonstrated use of LH reacher and sock aid to don/doff socks. Plan is to return home with family support. Rec HH services at discharge.  -SD Occupational thearpy evaluation completed. Patient presented to ER due to SOA. Dx of acute hypoxic respiratory failure. On 2L O21/NC at baseline. Pt currently on 3.5 L 02/NC. Pt lives with spouse and ambulates short distances at home with a rollator and manages self care from chair level. Pt reports feeling much better than at time of admittance, yet she still c/o shortness of air with limited activity. She managed bed mobility, transfers and mobility in room using a rolling walker with CGA. Pt currently needs min assist for adl's due to limited activity tolerance and SOA with activity. Rec OT services for education with energy conservation and use of adaptive equipment for adl management. Anticipate discharge to home with home health support.  -SD      Row Name 04/04/25 1405          Therapy Assessment/Plan (OT)     Patient/Family Therapy Goal Statement (OT) feel better and return home  -SD     Criteria for Skilled Therapeutic Interventions Met (OT) yes;skilled treatment is necessary  -SD     Therapy Frequency (OT) other (see comments)  1 -2 visits for education with energy conservation/work simplification and use of LH AE for lower body adls.  -SD       Row Name 04/04/25 1517 04/04/25 1408       Therapy Plan Review/Discharge Plan (OT)    Equipment Needs Upon Discharge (OT) -- bathing equipment;dressing equipment  -SD    Anticipated Discharge Disposition (OT) home with assist;home with home health  -SD home with assist;home with home health  -SD      Row Name 04/04/25 1408          Vital Signs    Pre SpO2 (%) 97  -SD     O2 Delivery Pre Treatment supplemental O2  3.5L  -SD     Post SpO2 (%) 94  after transfer/mobility activity  -SD     O2 Delivery Post Treatment supplemental O2  3.5 L  -SD     Pre Patient Position Supine  -SD     Post Patient Position Sitting  -SD       Row Name 04/04/25 1517 04/04/25 1408       Positioning and Restraints    Pre-Treatment Position sitting in chair/recliner  -SD in bed  -SD    Post Treatment Position chair  -SD chair  -SD    In Chair reclined;call light within reach;encouraged to call for assist;with family/caregiver;notified nsg  -SD notified nsg;reclined;call light within reach;encouraged to call for assist;with family/caregiver  -SD              User Key  (r) = Recorded By, (t) = Taken By, (c) = Cosigned By      Initials Name Provider Type    SD Mello [place], OTR Occupational Therapist                   Outcome Measures       Row Name 04/04/25 1413          How much help from another is currently needed...    Putting on and taking off regular lower body clothing? 3  -SD     Bathing (including washing, rinsing, and drying) 3  -SD     Toileting (which includes using toilet bed pan or urinal) 3  -SD     Putting on and taking off regular upper body clothing 4  -SD     Taking care of personal  grooming (such as brushing teeth) 4  -SD     Eating meals 4  -SD     AM-PAC 6 Clicks Score (OT) 21  -SD       Row Name 04/04/25 1241 04/04/25 0944       How much help from another person do you currently need...    Turning from your back to your side while in flat bed without using bedrails? 3  -BP 3  -AB    Moving from lying on back to sitting on the side of a flat bed without bedrails? 3  -BP 3  -AB    Moving to and from a bed to a chair (including a wheelchair)? 3  -BP 3  -AB    Standing up from a chair using your arms (e.g., wheelchair, bedside chair)? 3  -BP 3  -AB    Climbing 3-5 steps with a railing? 3  -BP 3  -AB    To walk in hospital room? 3  -BP 3  -AB    AM-PAC 6 Clicks Score (PT) 18  -BP 18  -AB    Highest Level of Mobility Goal 6 --> Walk 10 steps or more  -BP 6 --> Walk 10 steps or more  -AB      Row Name 04/04/25 0438          How much help from another person do you currently need...    Turning from your back to your side while in flat bed without using bedrails? 3  -AT     Moving from lying on back to sitting on the side of a flat bed without bedrails? 3  -AT     Moving to and from a bed to a chair (including a wheelchair)? 3  -AT     Standing up from a chair using your arms (e.g., wheelchair, bedside chair)? 2  -AT     Climbing 3-5 steps with a railing? 2  -AT     To walk in hospital room? 2  -AT     AM-PAC 6 Clicks Score (PT) 15  -AT     Highest Level of Mobility Goal 4 --> Transfer to chair/commode  -AT       Row Name 04/04/25 1413 04/04/25 1241       Functional Assessment    Outcome Measure Options AM-PAC 6 Clicks Daily Activity (OT)  -SD AM-PAC 6 Clicks Basic Mobility (PT)  -BP              User Key  (r) = Recorded By, (t) = Taken By, (c) = Cosigned By      Initials Name Provider Type    Mello Young OTR Occupational Therapist    BP Young Mejia, PT Physical Therapist    Gail Tong, RN Registered Nurse    AT [facility] RN Registered Nurse                  Occupational  Therapy Education       Title: PT OT SLP Therapies (Done)       Topic: Occupational Therapy (Resolved)       Point: ADL training (Resolved)       Learning Progress Summary            Patient Acceptance, E,TB,D, VU,DU by SD at 4/4/2025 1522    Comment: Education regarding EC/WS and use of LH AE for adls. Pt demonstrated use of reacher and sock aide. Plan is for discharge to home with family support. Rec HH services. No further OT rec in acute setting.    Acceptance, E, VU by SD at 4/4/2025 1414    Comment: Education regarding OT services, benfits of activity and safety with adl's, bed mboility, transfers and mobility. Rec continued education regarding EC/WS and use of LH AE for adls.   Family Acceptance, E,TB,D, VU,DU by SD at 4/4/2025 1522    Comment: Education regarding EC/WS and use of LH AE for adls. Pt demonstrated use of reacher and sock aide. Plan is for discharge to home with family support. Rec HH services. No further OT rec in acute setting.    Acceptance, E, VU by SD at 4/4/2025 1414    Comment: Education regarding OT services, benfits of activity and safety with adl's, bed mboility, transfers and mobility. Rec continued education regarding EC/WS and use of LH AE for adls.   Significant Other Acceptance, E,TB,D, VU,DU by SD at 4/4/2025 1522    Comment: Education regarding EC/WS and use of LH AE for adls. Pt demonstrated use of reacher and sock aide. Plan is for discharge to home with family support. Rec HH services. No further OT rec in acute setting.                                      User Key       Initials Effective Dates Name Provider Type Discipline    SD 06/16/21 -  Mello Oviedo OTR Occupational Therapist OT                  OT Recommendation and Plan  Planned Therapy Interventions (OT): patient/caregiver education/training, adaptive equipment training, BADL retraining  Therapy Frequency (OT): other (see comments) (1 -2 visits for education with energy conservation/work simplification and use  of  AE for lower body adls.)  Plan of Care Review  Plan of Care Reviewed With: patient, spouse, daughter, family  Outcome Evaluation: Occupational therapy. Education provided regarding energy conservation principles and use of long handled adaptive equipment during adl routine. Pt demonstrated use of  reacher and sock aid to don/doff socks. Plan is to return home with family support. Rec  services at discharge.       Time Calculation:   Evaluation Complexity (OT)  Review Occupational Profile/Medical/Therapy History Complexity: brief/low complexity  Assessment, Occupational Performance/Identification of Deficit Complexity: 1-3 performance deficits  Clinical Decision Making Complexity (OT): problem focused assessment/low complexity  Overall Complexity of Evaluation (OT): low complexity     Time Calculation- OT       Row Name 04/04/25 1524 04/04/25 1415          Time Calculation- OT    OT Start Time 1450  -SD 1020  -SD     OT Stop Time -- 1047  -SD     OT Time Calculation (min) -- 27 min  -SD        Timed Charges    28802 - OT Self Care/Mgmt Minutes 23  -SD --        Untimed Charges    OT Eval/Re-eval Minutes -- 27  -SD        Total Minutes    Timed Charges Total Minutes 23  -SD --     Untimed Charges Total Minutes -- 27  -SD      Total Minutes 23  -SD 27  -SD               User Key  (r) = Recorded By, (t) = Taken By, (c) = Cosigned By      Initials Name Provider Type    Mello Young OTR Occupational Therapist                  Therapy Charges for Today       Code Description Service Date Service Provider Modifiers Qty    [id number]  OT EVAL LOW COMPLEXITY 2 4/4/2025 Mello Oviedo OTR GO 1               OT Discharge Summary  Anticipated Discharge Disposition (OT): home with assist, home with home health  Reason for Discharge: All goals achieved  Outcomes Achieved: Able to achieve all goals within established timeline    JADEN Das  4/4/2025

## 2025-04-04 NOTE — PLAN OF CARE
Goal Outcome Evaluation:  Plan of Care Reviewed With: patient, spouse, child (daughters)           Outcome Evaluation: SLP: Completed clinical swallow evaluation. Pt presents with a functional oropharyngeal swallow with normal oral prep, transit and clearance. Pharyngeal phase of swallowing WNL with timely swallow and no clinical s/s of aspiration on any trial or consistency. Impressions: Normal oral phase of swallowing, no suspected pharyngeal dysphagia. Recommend: Regular diet with thin liquids. No further SLP evaluation or therapy indicated at this time.    Anticipated Discharge Disposition (SLP): home with assist          SLP Swallowing Diagnosis: swallow WFL/no suspected pharyngeal impairment (04/04/25 7475)

## 2025-04-04 NOTE — NURSING NOTE
Cwon consult received. Patient with left breast crease fungal dermatitis. Area is moist, red and non-blanchable. Skin to skin friction likely also plays a part in the redness. Will add antifungal ointment in addition to the powder already ordered. I discussed with BETHANY Reynolds and will recommend using the ointment first and once redness fades and improvement is noted, can switch to the powder for maintenance. Using the powder to other skin folds and creases now, is appropriate.     Inner buttocks also with moisture related skin damage. There was a dressing place but I feel the dsg was likely trapping moisture so I removed it and will recommend using barrier ointment instead.    I have added orders in Epic as well as prevention measures.     Please call with any questions.

## 2025-04-04 NOTE — PROGRESS NOTES
Adult Nutrition  Assessment/PES    Patient Name:  Nikki Merino  YOB: 1950  MRN: [id number]  Admit Date:  4/3/2025    Assessment Date:  4/4/2025    Comments:  coumadin    Pt & family denies edu needs on vitamin K/coumadin    Encourage po and voice food prefs    Will cont to follow and monitor.      Reason for Assessment       Row Name 04/04/25 1218          Reason for Assessment    Reason For Assessment per organizational policy  coumadin     Diagnosis infection/sepsis;pulmonary disease  acute on chronic resp failure, DM                    Nutrition/Diet History       Row Name 04/04/25 1218          Nutrition/Diet History    Typical Intake (Food/Fluid/EN/PN) Spoke w pt, daughter, spouse. NKFA. Tolerating am meal.                    Labs/Tests/Procedures/Meds       Row Name 04/04/25 1218          Labs/Procedures/Meds    Lab Results Reviewed reviewed     Lab Results Comments NH3 53 elevated, glu 229-204, tbili 1.3 H        Diagnostic Tests/Procedures    Diagnostic Test/Procedure Reviewed reviewed        Medications    Pertinent Medications Reviewed reviewed     Pertinent Medications Comments lactulose, humalog                    Physical Findings       Row Name 04/04/25 1219          Physical Findings    Overall Physical Appearance L breast and coccyx                    Estimated/Assessed Needs - Anthropometrics       Row Name 04/04/25 1219          Anthropometrics    Calculation Weight 94.1 kg (207 lb 7.3 oz)        Estimated/Assessed Needs    Additional Documentation Estimated Calorie Needs (Group);Fluid Requirements (Group);Protein Requirements (Group)        Estimated Calorie Needs    Estimated Calorie Require (kcal/day) 8413-7483 kcal (18-20 kcal/kg )     Estimated Calorie Need Method kcal/kg        Protein Requirements    Est Protein Requirement Amount (gms/kg) 1.0 gm protein  94 gm pro        Fluid Requirements    Estimated Fluid Requirement Method RDA Method  2128-5948 ml                     Nutrition Prescription Ordered       Row Name 04/04/25 1220          Nutrition Prescription PO    Common Modifiers Consistent Carbohydrate                    Evaluation of Received Nutrient/Fluid Intake       Row Name 04/04/25 1220          Fluid Intake Evaluation    Oral Fluid (mL) 240        PO Evaluation    Number of Days PO Intake Evaluated Insufficient Data                       Problem/Interventions:   Problem 1       Row Name 04/04/25 1220          Nutrition Diagnoses Problem 1    Problem 1 Other (comment)  Inadequate energy intake relatged to resp failure as evidenced by decreased appetite                          Intervention Goal       Row Name 04/04/25 1220          Intervention Goal    General Meet nutritional needs for age/condition     PO PO intake (%)     PO Intake % 50 %                    Nutrition Intervention       Row Name 04/04/25 1220          Nutrition Intervention    RD/Tech Action Encourage intake;Follow Tx progress                      Education/Evaluation       Row Name 04/04/25 1221          Education    Education Education topics;Education offered and refused  Edu pt on diet. Edu on vitamin K/coumaind interaction.     Education Topics Vitamin K        Monitor/Evaluation    Monitor Per protocol;I&O;PO intake;Pertinent labs;Weight     Education Follow-up Other (comment)  pt & family reports familiar w vitamin K/coumadin, declines edu needs                     Electronically signed by:  Shaunna Cain RD  04/04/25 12:21 EDT

## 2025-04-04 NOTE — THERAPY EVALUATION
Patient Name: Nikki Merino  : 1950    MRN: [id number]                              Today's Date: 2025       Admit Date: 4/3/2025    Visit Dx:     ICD-10-CM ICD-9-CM   1. Multifocal pneumonia  J18.9 486   2. Sepsis with acute organ dysfunction without septic shock, due to unspecified organism, unspecified organ dysfunction type  A41.9 038.9    R65.20 995.92   3. Atrial fibrillation with rapid ventricular response  I48.91 427.31   4. Acute hypoxemic respiratory failure  J96.01 518.81     Patient Active Problem List   Diagnosis    Sepsis due to pneumonia    Acute hypoxic respiratory failure    Bronchiectasis    Chronic atrial fibrillation with RVR    HTN (hypertension)    HLD (hyperlipidemia)     Past Medical History:   Diagnosis Date    Atrial fibrillation     COPD (chronic obstructive pulmonary disease)     use 2 LPM NC    Diabetes mellitus     GERD (gastroesophageal reflux disease)     Hypertension     Urinary tract infection      Past Surgical History:   Procedure Laterality Date    LUNG REMOVAL, PARTIAL      LEFT LOWER LUNG      General Information       Row Name 25 1358          OT Time and Intention    Subjective Information --  Pt reports feeling much better than at time of admittance.  -SD     Document Type evaluation  -SD     Mode of Treatment occupational therapy  -SD     Patient Effort good  -SD       Row Name 25 2010          General Information    Patient Profile Reviewed yes  Patient presented due to SOA. Admitted for treatment acute hypoxic respiratory failure. On 2L O21/NC at baseline. Admitted two months ago for PNA. Pt lives with spouse and ambulates short distances at home with a rollator and manages self care.  -SD     Prior Level of Function --  Per patient and family she is independent with mobility within home with use of rollator. She reports independence with ADLs from chair level. She is sedentary and sleeps in a recliner. She is able to ambulate household distances.   -SD     Existing Precautions/Restrictions fall;oxygen therapy device and L/min  3.5 L 02  -SD     Barriers to Rehab previous functional deficit  -SD       Row Name 04/04/25 1358          Occupational Profile    Reason for Services/Referral (Occupational Profile) decreased adl function  -SD     Successful Occupations (Occupational Profile) I with basic adl's and mobility in home using a rollator  -SD     Occupational History/Life Experiences (Occupational Profile) Pt wears 02 at home. Pt with hx of partial left lung removal  -SD     Environmental Supports and Barriers (Occupational Profile) spouse at home. familly supportive  -SD     Patient Goals (Occupational Profile) return home at discharge  -SD       Row Name 04/04/25 1358          Living Environment    Current Living Arrangements home  -SD     People in Home spouse  -SD       Row Name 04/04/25 1358          Home Main Entrance    Number of Stairs, Main Entrance other (see comments)  ramp to enter home  -SD       Row Name 04/04/25 1358          Stairs Within Home, Primary    Stairs, Within Home, Primary one level home  -SD       Row Name 04/04/25 1358          Cognition    Orientation Status (Cognition) oriented x 3  -SD               User Key  (r) = Recorded By, (t) = Taken By, (c) = Cosigned By      Initials Name Provider Type    SD Mello Oviedo, JADEN Occupational Therapist                     Mobility/ADL's       Row Name 04/04/25 1403          Bed Mobility    Bed Mobility supine-sit  -SD     Supine-Sit Jerome (Bed Mobility) contact guard;verbal cues  -SD     Assistive Device (Bed Mobility) head of bed elevated;bed rails  -SD       Row Name 04/04/25 1403          Sit-Stand Transfer    Sit-Stand Jerome (Transfers) contact guard;verbal cues  -SD     Assistive Device (Sit-Stand Transfers) walker, front-wheeled  -SD       Row Name 04/04/25 1403          Functional Mobility    Functional Mobility- Ind. Level contact guard assist  -SD     Functional  Mobility- Device walker, front-wheeled  -SD     Functional Mobility-Distance (Feet) 40  -SD       Row Name 04/04/25 1403          Activities of Daily Living    BADL Assessment/Intervention other (see comments);bathing;lower body dressing  Pt currently needs min assist for adl's due to limited activity tolerance and SOA with activity. Rec education with energy conservation and use of adaptive equipment for adl management.  -SD       Row Name 04/04/25 1403          Bathing Assessment/Intervention    [place] Level (Bathing) bathing skills;lower body;minimum assist (75% patient effort)  -SD       Row Name 04/04/25 1403          Lower Body Dressing Assessment/Training    [place] Level (Lower Body Dressing) lower body dressing skills;minimum assist (75% patient effort)  -SD               User Key  (r) = Recorded By, (t) = Taken By, (c) = Cosigned By      Initials Name Provider Type    Mello Young OTR Occupational Therapist                   Obj/Interventions       Row Name 04/04/25 1407          Range of Motion Comprehensive    Comment, General Range of Motion BUE rom functional for tasks during evaluation  -SD       Row Name 04/04/25 1407          Strength Comprehensive (MMT)    Comment, General Manual Muscle Testing (MMT) Assessment BUE strength functional for tasks during evaluation  -SD       Row Name 04/04/25 1407          Balance    Comment, Balance supervision for sitting balance and CGA for standing balance using a rolling walker for support  -SD               User Key  (r) = Recorded By, (t) = Taken By, (c) = Cosigned By      Initials Name Provider Type    Mello Young OTR Occupational Therapist                   Goals/Plan       Row Name 04/04/25 1412          Dressing Goal 1 (OT)    Activity/Device (Dressing Goal 1, OT) lower body dressing;other (see comments)  using AE as needed  -SD     [place]/Cues Needed (Dressing Goal 1, OT) modified independence  -SD     Time Frame  (Dressing Goal 1, OT) by discharge  -SD     Strategies/Barriers (Dressing Goal 1, OT) Education with EC/WS principles and use of LH AE  -SD     Progress/Outcome (Dressing Goal 1, OT) new goal  -SD       Row Name 04/04/25 1412          Problem Specific Goal 1 (OT)    Problem Specific Goal 1 (OT) Pt to verbalize 2 EC/WS principle to incorporate during adl routine.  -SD     Time Frame (Problem Specific Goal 1, OT) by discharge  -SD     Strategies/Barriers (Problem Specific Goal 1, OT) Education with EC/WS principle and use of LH AE  -SD     Progress/Outcome (Problem Specific Goal 1, OT) new goal  -SD       Row Name 04/04/25 1412          Therapy Assessment/Plan (OT)    Planned Therapy Interventions (OT) adaptive equipment training;BADL retraining;patient/caregiver education/training  -SD               User Key  (r) = Recorded By, (t) = Taken By, (c) = Cosigned By      Initials Name Provider Type    Mello Young, OTR Occupational Therapist                   Clinical Impression       Row Name 04/04/25 1408          Pain Assessment    Pretreatment Pain Rating 0/10 - no pain  -SD     Posttreatment Pain Rating 0/10 - no pain  -SD       Row Name 04/04/25 1408          Plan of Care Review    Plan of Care Reviewed With patient;spouse;daughter  -SD     Outcome Evaluation Occupational thearpy evaluation completed. Patient presented to ER due to SOA. Dx of acute hypoxic respiratory failure. On 2L O21/NC at baseline. Pt currently on 3.5 L 02/NC. Pt lives with spouse and ambulates short distances at home with a rollator and manages self care from chair level. Pt reports feeling much better than at time of admittance, yet she still c/o shortness of air with limited activity. She managed bed mobility, transfers and mobility in room using a rolling walker with CGA. Pt currently needs min assist for adl's due to limited activity tolerance and SOA with activity. Rec OT services for education with energy conservation and use of  adaptive equipment for adl management. Anticipate discharge to home with home health support.  -SD       Row Name 04/04/25 1408          Therapy Assessment/Plan (OT)    Patient/Family Therapy Goal Statement (OT) feel better and return home  -SD     Criteria for Skilled Therapeutic Interventions Met (OT) yes;skilled treatment is necessary  -SD     Therapy Frequency (OT) other (see comments)  1 -2 visits for education with energy conservation/work simplification and use of LH AE for lower body adls.  -SD       Row Name 04/04/25 1408          Therapy Plan Review/Discharge Plan (OT)    Equipment Needs Upon Discharge (OT) bathing equipment;dressing equipment  -SD     Anticipated Discharge Disposition (OT) home with assist;home with home health  -SD       Row Name 04/04/25 1408          Vital Signs    Pre SpO2 (%) 97  -SD     O2 Delivery Pre Treatment supplemental O2  3.5L  -SD     Post SpO2 (%) 94  after transfer/mobility activity  -SD     O2 Delivery Post Treatment supplemental O2  3.5 L  -SD     Pre Patient Position Supine  -SD     Post Patient Position Sitting  -SD       Row Name 04/04/25 1408          Positioning and Restraints    Pre-Treatment Position in bed  -SD     Post Treatment Position chair  -SD     In Chair notified nsg;reclined;call light within reach;encouraged to call for assist;with family/caregiver  -SD               User Key  (r) = Recorded By, (t) = Taken By, (c) = Cosigned By      Initials Name Provider Type    Mello Young, OTR Occupational Therapist                   Outcome Measures       Row Name 04/04/25 1413          How much help from another is currently needed...    Putting on and taking off regular lower body clothing? 3  -SD     Bathing (including washing, rinsing, and drying) 3  -SD     Toileting (which includes using toilet bed pan or urinal) 3  -SD     Putting on and taking off regular upper body clothing 4  -SD     Taking care of personal grooming (such as brushing teeth) 4   -SD     Eating meals 4  -SD     AM-PAC 6 Clicks Score (OT) 21  -SD       Row Name 04/04/25 1241 04/04/25 0438       How much help from another person do you currently need...    Turning from your back to your side while in flat bed without using bedrails? 3  -BP 3  -AT    Moving from lying on back to sitting on the side of a flat bed without bedrails? 3  -BP 3  -AT    Moving to and from a bed to a chair (including a wheelchair)? 3  -BP 3  -AT    Standing up from a chair using your arms (e.g., wheelchair, bedside chair)? 3  -BP 2  -AT    Climbing 3-5 steps with a railing? 3  -BP 2  -AT    To walk in hospital room? 3  -BP 2  -AT    AM-PAC 6 Clicks Score (PT) 18  -BP 15  -AT    Highest Level of Mobility Goal 6 --> Walk 10 steps or more  -BP 4 --> Transfer to chair/commode  -AT      Row Name 04/04/25 0310          How much help from another person do you currently need...    Turning from your back to your side while in flat bed without using bedrails? 3  -AT     Moving from lying on back to sitting on the side of a flat bed without bedrails? 3  -AT     Moving to and from a bed to a chair (including a wheelchair)? 3  -AT     Standing up from a chair using your arms (e.g., wheelchair, bedside chair)? 2  -AT     Climbing 3-5 steps with a railing? 2  -AT     To walk in hospital room? 2  -AT     AM-PAC 6 Clicks Score (PT) 15  -AT     Highest Level of Mobility Goal 4 --> Transfer to chair/commode  -AT       Row Name 04/04/25 1413 04/04/25 1241       Functional Assessment    Outcome Measure Options AM-PAC 6 Clicks Daily Activity (OT)  -SD AM-PAC 6 Clicks Basic Mobility (PT)  -BP              User Key  (r) = Recorded By, (t) = Taken By, (c) = Cosigned By      Initials Name Provider Type    Mello Young, OTR Occupational Therapist    BP Young Mejia, PT Physical Therapist    AT John Bang RN Registered Nurse                    Occupational Therapy Education       Title: PT OT SLP Therapies (Done)       Topic:  Occupational Therapy (Done)       Point: ADL training (Done)       Learning Progress Summary            Patient Acceptance, E, VU by SD at 4/4/2025 1414    Comment: Education regarding OT services, benfits of activity and safety with adl's, bed mboility, transfers and mobility. Rec continued education regarding EC/WS and use of LH AE for adls.   Family Acceptance, E, VU by SD at 4/4/2025 1414    Comment: Education regarding OT services, benfits of activity and safety with adl's, bed mboility, transfers and mobility. Rec continued education regarding EC/WS and use of LH AE for adls.                                      User Key       Initials Effective Dates Name Provider Type Discipline    SD 06/16/21 -  Mello Oviedo OTR Occupational Therapist OT                  OT Recommendation and Plan  Planned Therapy Interventions (OT): adaptive equipment training, BADL retraining, patient/caregiver education/training  Therapy Frequency (OT): other (see comments) (1 -2 visits for education with energy conservation/work simplification and use of LH AE for lower body adls.)  Plan of Care Review  Plan of Care Reviewed With: patient, spouse, daughter  Outcome Evaluation: Occupational thearpy evaluation completed. Patient presented to ER due to SOA. Dx of acute hypoxic respiratory failure. On 2L O21/NC at baseline. Pt currently on 3.5 L 02/NC. Pt lives with spouse and ambulates short distances at home with a rollator and manages self care from chair level. Pt reports feeling much better than at time of admittance, yet she still c/o shortness of air with limited activity. She managed bed mobility, transfers and mobility in room using a rolling walker with CGA. Pt currently needs min assist for adl's due to limited activity tolerance and SOA with activity. Rec OT services for education with energy conservation and use of adaptive equipment for adl management. Anticipate discharge to home with home health support.     Time  Calculation:   Evaluation Complexity (OT)  Review Occupational Profile/Medical/Therapy History Complexity: brief/low complexity  Assessment, Occupational Performance/Identification of Deficit Complexity: 1-3 performance deficits  Clinical Decision Making Complexity (OT): problem focused assessment/low complexity  Overall Complexity of Evaluation (OT): low complexity     Time Calculation- OT       Row Name 04/04/25 1415             Time Calculation- OT    OT Start Time 1020  -SD      OT Stop Time 1047  -SD      OT Time Calculation (min) 27 min  -SD         Untimed Charges    OT Eval/Re-eval Minutes 27  -SD         Total Minutes    Untimed Charges Total Minutes 27  -SD       Total Minutes 27  -SD                User Key  (r) = Recorded By, (t) = Taken By, (c) = Cosigned By      Initials Name Provider Type    SD Mello Oviedo OTR Occupational Therapist                  Therapy Charges for Today       Code Description Service Date Service Provider Modifiers Qty    04300254023  OT EVAL LOW COMPLEXITY 2 4/4/2025 Mello Oviedo OTR GO 1                 JADEN Das  4/4/2025

## 2025-04-05 LAB
ALBUMIN SERPL-MCNC: 3.1 G/DL (ref 3.5–5.2)
ALBUMIN/GLOB SERPL: 0.9 G/DL
ALP SERPL-CCNC: 85 U/L (ref 39–117)
ALT SERPL W P-5'-P-CCNC: 22 U/L (ref 1–33)
ANION GAP SERPL CALCULATED.3IONS-SCNC: 8.8 MMOL/L (ref 5–15)
AST SERPL-CCNC: 22 U/L (ref 1–32)
BASOPHILS # BLD AUTO: 0.05 10*3/MM3 (ref 0–0.2)
BASOPHILS NFR BLD AUTO: 0.3 % (ref 0–1.5)
BILIRUB SERPL-MCNC: 0.9 MG/DL (ref 0–1.2)
BUN SERPL-MCNC: 14 MG/DL (ref 8–23)
BUN/CREAT SERPL: 20.3 (ref 7–25)
CALCIUM SPEC-SCNC: 9.8 MG/DL (ref 8.6–10.5)
CHLORIDE SERPL-SCNC: 105 MMOL/L (ref 98–107)
CO2 SERPL-SCNC: 26.2 MMOL/L (ref 22–29)
CREAT SERPL-MCNC: 0.69 MG/DL (ref 0.57–1)
DEPRECATED RDW RBC AUTO: 49.4 FL (ref 37–54)
EGFRCR SERPLBLD CKD-EPI 2021: 90.1 ML/MIN/1.73
EOSINOPHIL # BLD AUTO: 0.02 10*3/MM3 (ref 0–0.4)
EOSINOPHIL NFR BLD AUTO: 0.1 % (ref 0.3–6.2)
ERYTHROCYTE [DISTWIDTH] IN BLOOD BY AUTOMATED COUNT: 14.2 % (ref 12.3–15.4)
GLOBULIN UR ELPH-MCNC: 3.4 GM/DL
GLUCOSE BLDC GLUCOMTR-MCNC: 114 MG/DL (ref 70–130)
GLUCOSE BLDC GLUCOMTR-MCNC: 165 MG/DL (ref 70–130)
GLUCOSE BLDC GLUCOMTR-MCNC: 169 MG/DL (ref 70–130)
GLUCOSE BLDC GLUCOMTR-MCNC: 174 MG/DL (ref 70–130)
GLUCOSE SERPL-MCNC: 128 MG/DL (ref 65–99)
HCT VFR BLD AUTO: 48 % (ref 34–46.6)
HGB BLD-MCNC: 15.6 G/DL (ref 12–15.9)
IMM GRANULOCYTES # BLD AUTO: 0.1 10*3/MM3 (ref 0–0.05)
IMM GRANULOCYTES NFR BLD AUTO: 0.6 % (ref 0–0.5)
INR PPP: 4.16 (ref 0.9–1.1)
LYMPHOCYTES # BLD AUTO: 2.26 10*3/MM3 (ref 0.7–3.1)
LYMPHOCYTES NFR BLD AUTO: 13.3 % (ref 19.6–45.3)
MAGNESIUM SERPL-MCNC: 1.6 MG/DL (ref 1.6–2.4)
MCH RBC QN AUTO: 31.1 PG (ref 26.6–33)
MCHC RBC AUTO-ENTMCNC: 32.5 G/DL (ref 31.5–35.7)
MCV RBC AUTO: 95.6 FL (ref 79–97)
MONOCYTES # BLD AUTO: 1.12 10*3/MM3 (ref 0.1–0.9)
MONOCYTES NFR BLD AUTO: 6.6 % (ref 5–12)
NEUTROPHILS NFR BLD AUTO: 13.45 10*3/MM3 (ref 1.7–7)
NEUTROPHILS NFR BLD AUTO: 79.1 % (ref 42.7–76)
NRBC BLD AUTO-RTO: 0 /100 WBC (ref 0–0.2)
PLATELET # BLD AUTO: 336 10*3/MM3 (ref 140–450)
PMV BLD AUTO: 9.7 FL (ref 6–12)
POTASSIUM SERPL-SCNC: 4.2 MMOL/L (ref 3.5–5.2)
PROT SERPL-MCNC: 6.5 G/DL (ref 6–8.5)
PROTHROMBIN TIME: 41.1 SECONDS (ref 12.1–15)
RBC # BLD AUTO: 5.02 10*6/MM3 (ref 3.77–5.28)
SODIUM SERPL-SCNC: 140 MMOL/L (ref 136–145)
VANCOMYCIN PEAK SERPL-MCNC: 14 MCG/ML (ref 20–40)
WBC NRBC COR # BLD AUTO: 17 10*3/MM3 (ref 3.4–10.8)

## 2025-04-05 PROCEDURE — 80053 COMPREHEN METABOLIC PANEL: CPT | Performed by: INTERNAL MEDICINE

## 2025-04-05 PROCEDURE — 97110 THERAPEUTIC EXERCISES: CPT

## 2025-04-05 PROCEDURE — 99232 SBSQ HOSP IP/OBS MODERATE 35: CPT | Performed by: INTERNAL MEDICINE

## 2025-04-05 PROCEDURE — 83735 ASSAY OF MAGNESIUM: CPT | Performed by: INTERNAL MEDICINE

## 2025-04-05 PROCEDURE — 82784 ASSAY IGA/IGD/IGG/IGM EACH: CPT | Performed by: INTERNAL MEDICINE

## 2025-04-05 PROCEDURE — 63710000001 INSULIN LISPRO (HUMAN) PER 5 UNITS: Performed by: STUDENT IN AN ORGANIZED HEALTH CARE EDUCATION/TRAINING PROGRAM

## 2025-04-05 PROCEDURE — 94799 UNLISTED PULMONARY SVC/PX: CPT

## 2025-04-05 PROCEDURE — 94664 DEMO&/EVAL PT USE INHALER: CPT

## 2025-04-05 PROCEDURE — 25010000002 CEFEPIME PER 500 MG: Performed by: STUDENT IN AN ORGANIZED HEALTH CARE EDUCATION/TRAINING PROGRAM

## 2025-04-05 PROCEDURE — 85610 PROTHROMBIN TIME: CPT | Performed by: STUDENT IN AN ORGANIZED HEALTH CARE EDUCATION/TRAINING PROGRAM

## 2025-04-05 PROCEDURE — 82948 REAGENT STRIP/BLOOD GLUCOSE: CPT

## 2025-04-05 PROCEDURE — 94761 N-INVAS EAR/PLS OXIMETRY MLT: CPT

## 2025-04-05 PROCEDURE — 85025 COMPLETE CBC W/AUTO DIFF WBC: CPT | Performed by: INTERNAL MEDICINE

## 2025-04-05 PROCEDURE — 80202 ASSAY OF VANCOMYCIN: CPT | Performed by: STUDENT IN AN ORGANIZED HEALTH CARE EDUCATION/TRAINING PROGRAM

## 2025-04-05 RX ADMIN — CEFEPIME 2000 MG: 2 INJECTION, POWDER, FOR SOLUTION INTRAVENOUS at 06:30

## 2025-04-05 RX ADMIN — INSULIN LISPRO 2 UNITS: 100 INJECTION, SOLUTION INTRAVENOUS; SUBCUTANEOUS at 08:58

## 2025-04-05 RX ADMIN — CEFEPIME 2000 MG: 2 INJECTION, POWDER, FOR SOLUTION INTRAVENOUS at 18:30

## 2025-04-05 RX ADMIN — Medication 10 ML: at 20:01

## 2025-04-05 RX ADMIN — METOPROLOL TARTRATE 25 MG: 25 TABLET, FILM COATED ORAL at 08:58

## 2025-04-05 RX ADMIN — NYSTATIN: 100000 POWDER TOPICAL at 08:58

## 2025-04-05 RX ADMIN — NYSTATIN 1 APPLICATION: 100000 OINTMENT TOPICAL at 20:01

## 2025-04-05 RX ADMIN — METOPROLOL TARTRATE 25 MG: 25 TABLET, FILM COATED ORAL at 20:01

## 2025-04-05 RX ADMIN — NYSTATIN: 100000 POWDER TOPICAL at 20:01

## 2025-04-05 RX ADMIN — LACTULOSE 10 G: 20 SOLUTION ORAL at 08:58

## 2025-04-05 RX ADMIN — LACTULOSE 10 G: 20 SOLUTION ORAL at 20:00

## 2025-04-05 RX ADMIN — IPRATROPIUM BROMIDE AND ALBUTEROL SULFATE 3 ML: .5; 3 SOLUTION RESPIRATORY (INHALATION) at 19:45

## 2025-04-05 RX ADMIN — ATORVASTATIN CALCIUM 40 MG: 40 TABLET, FILM COATED ORAL at 20:01

## 2025-04-05 RX ADMIN — INSULIN LISPRO 2 UNITS: 100 INJECTION, SOLUTION INTRAVENOUS; SUBCUTANEOUS at 20:01

## 2025-04-05 RX ADMIN — Medication 10 ML: at 08:58

## 2025-04-05 RX ADMIN — NYSTATIN 1 APPLICATION: 100000 OINTMENT TOPICAL at 08:58

## 2025-04-05 RX ADMIN — INSULIN LISPRO 2 UNITS: 100 INJECTION, SOLUTION INTRAVENOUS; SUBCUTANEOUS at 18:24

## 2025-04-05 RX ADMIN — IPRATROPIUM BROMIDE AND ALBUTEROL SULFATE 3 ML: .5; 3 SOLUTION RESPIRATORY (INHALATION) at 09:37

## 2025-04-05 NOTE — PROGRESS NOTES
"Patient Care Team:  Francoise Daly MD as PCP - General (Family Medicine)    Date: 2025  Patient Name: Nikki Merino  : 1950  MRN: [id number]  Date of admission: 4/3/2025  Room/Bed: [facility]      Subjective   Subjective         Chief Complaint: f/u sob     Summary:Nikki Merino is a 74 y.o. female  past medical history of chronic respiratory failure on 2 L , bronchiectasis, chronic atrial fibrillation on warfarin, hyperlipidemia, hypertension, memory impairment presenting from home with shortness of breath discotomy progressively worse over the last few days with associated increased work of breathing, sputum production, nausea without vomiting.  Patient's had multiple UTIs recently.  No dysuria or frequency at this time.     Recently admitted at [facility] from  -  also found to have pneumonia then.  Was discharged home with home health.    Interval Followup: Patient is sitting up in chair, states she is feeling okay. Multiple family members at bedside.      Review of Systems   Respiratory:  Positive for shortness of breath.          Objective   Objective       Vital Signs  Temp:  [96.8 °F (36 °C)-97.8 °F (36.6 °C)] 97.8 °F (36.6 °C)  Heart Rate:  [74-89] 80  Resp:  [16-22] 20  BP: (122-158)/(72-86) 138/81  Oxygen Therapy  SpO2: 95 %  Pulse Oximetry Type: Continuous  Device (Oxygen Therapy): nasal cannula  Flow (L/min) (Oxygen Therapy): 3.5  Flowsheet Rows      Flowsheet Row First Filed Value   Admission Height 162.6 cm (64\") Documented at 2025 2245   Admission Weight 93.4 kg (205 lb 14.4 oz) Documented at 2025 2245          Intake & Output (last 3 days)          07 07 0701   07 07 0700  0701   0700    P.O.  240 360 600    IV Piggyback  1350 500     Total Intake(mL/kg)  1590 (16.9) 860 (9.1) 600 (6.4)    Urine (mL/kg/hr)   1675 (0.7) 200 (0.3)    Stool   0     Total Output   1675 200    Net  +1590 -815 +400            Urine " "Unmeasured Occurrence   2 x     Stool Unmeasured Occurrence   3 x           Lines, Drains & Airways       Active LDAs       Name Placement date Placement time Site Days    Peripheral IV 04/04/25 1945 Anterior;Right Forearm 04/04/25 1945  Forearm  less than 1    External Urinary Catheter 04/04/25  0336  --  1                      Physical Exam  Vitals reviewed.   Constitutional:       General: She is not in acute distress.     Appearance: She is obese.   HENT:      Head: Normocephalic and atraumatic.   Cardiovascular:      Rate and Rhythm: Normal rate and regular rhythm.   Pulmonary:      Effort: Pulmonary effort is normal.      Comments: Coarse bs, on 3.5L NC  Abdominal:      Comments: Obese, nt, +bs   Skin:     General: Skin is warm and dry.   Neurological:      Mental Status: She is alert. Mental status is at baseline.   Psychiatric:         Mood and Affect: Mood normal.         Behavior: Behavior normal.           Results Review:      Results from last 7 days   Lab Units 04/05/25  0559 04/04/25  0409 04/03/25  2351   WBC 10*3/mm3 17.00* 22.97* 19.62*   HEMOGLOBIN g/dL 15.6 16.4* 15.5   HEMATOCRIT % 48.0* 49.9* 47.8*   PLATELETS 10*3/mm3 336 345 291     Results from last 7 days   Lab Units 04/05/25  0559 04/04/25  0825 04/03/25  2311   SODIUM mmol/L 140 138 139   POTASSIUM mmol/L 4.2 4.3 4.2   CHLORIDE mmol/L 105 105 101   CO2 mmol/L 26.2 22.7 25.3   BUN mg/dL 14 11 10   CREATININE mg/dL 0.69 0.76 0.90   CALCIUM mg/dL 9.8 9.7 10.6*   BILIRUBIN mg/dL 0.9 1.3* 1.7*   ALK PHOS U/L 85 94 112   ALT (SGPT) U/L 22 24 22   AST (SGOT) U/L 22 25 26   GLUCOSE mg/dL 128* 229* 177*       Results from last 7 days   Lab Units 04/05/25  0559   MAGNESIUM mg/dL 1.6       Blood Culture   Date Value Ref Range Status   04/03/2025 No growth at 24 hours  Preliminary   04/03/2025 No growth at 24 hours  Preliminary       No results found for: \"MRSACX\"    No results found for: \"STOOLCX\"    No results found for: \"URINECX\"    No results " "found for: \"WOUNDCX\"    Imaging Results (Last 24 Hours)       ** No results found for the last 24 hours. **            I have personally reviewed the patient's new imaging and lab results.          ECG/EMG Results (most recent)       Procedure Component Value Units Date/Time    ECG 12 Lead Chest Pain [[id number]] Collected: 04/03/25 2259     Updated: 04/04/25 0859     QT Interval 293 ms      QTC Interval 427 ms     Narrative:      HEART ODTF=189  bpm  RR Ppnlehnf=062  ms  ME Interval=  ms  P Horizontal Axis=  deg  P Front Axis=  deg  QRSD Interval=80  ms  QT Zpgvdlxe=577  ms  MRkL=021  ms  QRS Axis=25  deg  T Wave Axis=-41  deg  - ABNORMAL ECG -  Atrial fibrillation  c/w prior ecg, a fib now seen  Electronically Signed By: Brea Richard ([facility]) 2025-04-04 08:58:57  Date and Time of Study:2025-04-03 22:59:48                    Medication Review:     Scheduled Meds:atorvastatin, 40 mg, Oral, Nightly  cefepime, 2,000 mg, Intravenous, Q12H  insulin lispro, 2-7 Units, Subcutaneous, 4x Daily AC & at Bedtime  ipratropium-albuterol, 3 mL, Nebulization, BID - RT  lactulose, 10 g, Oral, BID  metoprolol tartrate, 25 mg, Oral, Q12H  nystatin, 1 Application, Topical, Q12H  nystatin, , Topical, Q12H  sodium chloride, 10 mL, Intravenous, Q12H  vitamin B-12, 100 mcg, Oral, Daily      Continuous Infusions:Pharmacy to dose warfarin,   Pharmacy To Dose:,       PRN Meds:.  acetaminophen **OR** acetaminophen **OR** acetaminophen    albuterol    senna-docusate sodium **AND** polyethylene glycol **AND** bisacodyl **AND** bisacodyl    dextrose    dextrose    glucagon (human recombinant)    nitroglycerin    Pharmacy to dose warfarin    Pharmacy To Dose:    sodium chloride    sodium chloride    sodium chloride    sodium chloride      I have reviewed the patient's current medication list    Assessment & Plan   Assessment / Plan       Active Hospital Problems    Diagnosis  POA    **Sepsis due to pneumonia [J18.9, A41.9]  Yes    Acute hypoxic " respiratory failure [J96.01]  Yes    Bronchiectasis [J47.9]  Yes    Chronic atrial fibrillation with RVR [I48.20]  Yes    HTN (hypertension) [I10]  Yes    HLD (hyperlipidemia) [E78.5]  Yes       Acute on chronic hypoxic respiratory failure 2L at baseline d/t Multifocal pneumonia  Bronchiectasis  Sepsis ruled out   - Small bolus given in the emergency room but not full sepsis bolus because of her lower extremity swelling  - sputum cx- pending  - blood cx- ngtd  - MRSA nares- negative  - dc Vancomycin  -Continue cefepime  -With recurrent pneumonia's, SLP evaluated, recommends ergular diet with thin liquids  - discussed with pulmonology  - IG levels pending      Chronic atrial fibrillation with RVR  Hyperlipidemia  Hypertension  - INR trending up to 4.16 today  - patient reports her last dose was on Wednesday  -  Patient takes warfarin 7.5 mg daily.  Pharmacy to dose warfarin  - patient follows with coumadin clinic at [facility]  - Continue atorvastatin. Changed metoprolol succinate 25mg to tartrate 25mg BID     Diabetes type 2  - recently taken off metformin, diet controlled  - continue ssi      Phyllodes tumor, right breast  - enlarged based on previous PCP note.  Patient had previously not wanted any intervention and confirms that she still does not want intervention     Elevated ammonia  -Daughter at bedside showed me a recent lab with ammonia level of 109, patient was not confused at the appointment that this ammonia level was drawn, she was subsequently started on lactulose  -Elevated bilirubin but normal AST/ALT  - Liver ultrasound reviewed  - Ammonia remains elevated at 53  - continue lactulose for now  - patient having 2-3 loose stools per day         DVT Prophylaxis  - warfarin       Code Status - Limited code (no CPR, no intubation).  Discussed with patient, , 2 daughters at bedside.     Discussed with patient, nursing, and multiple family members at bedside     Plan for disposition: home likely  in 1-2 days pending progress     Duc Carrillo DO  04/05/25  13:40 EDT      Note disclaimer: At [facility], we believe that sharing information builds trust and better relationships. You are receiving this note because you recently visited [facility]. It is possible you will see health information before a provider has talked with you about it. This kind of information can be easy to misunderstand. To help you fully understand what it means for your health, we urge you to discuss this note with your provider.

## 2025-04-05 NOTE — PLAN OF CARE
Goal Outcome Evaluation:  Plan of Care Reviewed With: patient        Progress: improving  Outcome Evaluation: Pt VSS, continues tele, afib HR 70's-90's, O2@3.5L, RT titrating, see RT charting.  Pt reports pain improved with current regimen, see emar, flowsheets.  Pt denies n/v/d, reports tolerating diet. IV antibiotics. Powders and creams apply as ordered to redness under breasts, abd folds and bottom.  Pt up in chair for meals; up with assist x1-2, walker; using BSC.  Pt resting at this time, family at bedside.

## 2025-04-05 NOTE — PLAN OF CARE
Problem: Fall Injury Risk  Goal: Absence of Fall and Fall-Related Injury  4/5/2025 0340 by John Bang RN  Outcome: Progressing  4/5/2025 0333 by John Bang RN  Outcome: Progressing  Intervention: Identify and Manage Contributors  Description: Develop a fall prevention plan, considering patient-centered interventions and family/caregiver involvement; identify and address patient's facilitators and barriers.Provide reorientation, appropriate sensory stimulation and routines with changes in mental status to decrease risk of fall.Promote use of personal vision and auditory aids.Assess assistance level required for safe and effective self-care; provide support as needed, such as toileting and mobilization. For age 65 and older, implement timed toileting with assistance.Encourage physical activity, such as performance of mobility and self-care at highest level of patient ability, multicomponent exercise program and provision of appropriate assistive devices.If fall occurs, assess the severity of injury; implement fall injury protocol. Determine the cause and revise fall injury prevention plan.Regularly review and advocate for medication adjustment to decrease fall risk; consider administration times, polypharmacy and age.Balance adequate pain management with potential for oversedation.  Recent Flowsheet Documentation  Taken 4/5/2025 0000 by John Bang, RN  Medication Review/Management: medications reviewed  Taken 4/4/2025 2200 by John Bang, RN  Medication Review/Management: medications reviewed  Intervention: Promote Injury-Free Environment  Description: Provide a safe, barrier-free environment that encourages independent activity.Keep care area uncluttered and well-lighted.Determine need for increased observation or monitoring.Avoid use of devices that minimize mobility, such as restraints or indwelling urinary catheter.  Recent Flowsheet Documentation  Taken 4/5/2025 0200 by John Bang, RN  Safety  Promotion/Fall Prevention: safety round/check completed  Taken 4/5/2025 0000 by John Bang RN  Safety Promotion/Fall Prevention: safety round/check completed  Taken 4/4/2025 2200 by John Bang, RN  Safety Promotion/Fall Prevention:   safety round/check completed   lighting adjusted   Goal Outcome Evaluation:  Plan of Care Reviewed With: patient        Progress: improving  Outcome Evaluation: Alert and oriented. O2 titration care of RT, currently on 3 LPM, tolerating well. Able to ambulate with a walker, assist x1, reports minimal SOA on exertion but better than before. On IV antibx, given as ordered. Tolerating PO intake. Nystatin crea/powder on affected areas per WCON. Denies pain, n/v. No complaint up to this moment.

## 2025-04-05 NOTE — PLAN OF CARE
Goal Outcome Evaluation:  Plan of Care Reviewed With: patient, family        Progress: improving  Outcome Evaluation: PT: Pt up to chair with nsg; sit to/from stand with CGA and cues; pt ambulated 60 ft with FWW (cues to maintain safe distance within walker) and CGA - assist with O2 lines; O2 sats decreased to 87% with gait but recovered once seated and cues for PLB; pt reporting decreased SOA with gait this morning

## 2025-04-05 NOTE — PROGRESS NOTES
[facility]     Mar/chart reviewed  Follow up pneumonia and bronchiectasis  Still with some cough productive of sputum not worse    Vital Sign Min/Max for last 24 hours  Temp  Min: 96.8 °F (36 °C)  Max: 97.9 °F (36.6 °C)   BP  Min: 122/72  Max: 158/86   Pulse  Min: 74  Max: 89   Resp  Min: 16  Max: 22   SpO2  Min: 91 %  Max: 96 %   Flow (L/min) (Oxygen Therapy)  Min: 3  Max: 4   No data recorded     Nad, axox3,   perrl, eomi, no icterus,  mmm, no jvd, trachea midline, neck supple,  chest fair ae, coarse  bilaterally, no crackles, no wheezes,   rrr,   soft, nt, nd +bs,  no c/c/e  Skin warm, dry no rashes    Labs: 4/5: reviewed:  Bun 14  Cr 0.69  Bicarb 26  Wbc 17  Hgb 15.6  Plts 336  4/3:  7.32/50/146     Ct chest: 4/4: right lower lobe infitrate, I suspect there is some bronchiecatsis there as well    A/P:  Pneumonia --will go ahead and stop the vanco. Can continue cefepime for now,   Bronchiectasis -- check ig levels with am draw tomorrow. Will probably need better ct when acute pneumonia cleared up.  Continue clearance measures  Chronic hypoxemic respiratory failure  Obesity    Patient is new to me today,   Possibly home in next day or two.

## 2025-04-05 NOTE — THERAPY TREATMENT NOTE
Acute Care - Physical Therapy Treatment Note  [facility]     Patient Name: Nikki Merino  : 1950  MRN: [id number]  Today's Date: 2025      Visit Dx:     ICD-10-CM ICD-9-CM   1. Multifocal pneumonia  J18.9 486   2. Sepsis with acute organ dysfunction without septic shock, due to unspecified organism, unspecified organ dysfunction type  A41.9 038.9    R65.20 995.92   3. Atrial fibrillation with rapid ventricular response  I48.91 427.31   4. Acute hypoxemic respiratory failure  J96.01 518.81     Patient Active Problem List   Diagnosis    Sepsis due to pneumonia    Acute hypoxic respiratory failure    Bronchiectasis    Chronic atrial fibrillation with RVR    HTN (hypertension)    HLD (hyperlipidemia)     Past Medical History:   Diagnosis Date    Atrial fibrillation     COPD (chronic obstructive pulmonary disease)     use 2 LPM NC    Diabetes mellitus     GERD (gastroesophageal reflux disease)     Hypertension     Urinary tract infection      Past Surgical History:   Procedure Laterality Date    LUNG REMOVAL, PARTIAL      LEFT LOWER LUNG     PT Assessment (Last 12 Hours)       PT Evaluation and Treatment       Row Name 25 1010          Physical Therapy Time and Intention    Subjective Information no complaints  -     Document Type therapy note (daily note)  -     Patient Effort good  -       Row Name 25 1010          General Information    Patient Observations alert;cooperative  -     Patient/Family/Caregiver Comments/Observations Pt sitting in recliner, family present in the room  -       Row Name 25 1010          Bed Mobility    Comment, (Bed Mobility) deferred - up with nsg  -       Row Name 25 1010          Sit-Stand Transfer    Sit-Stand [place] (Transfers) verbal cues;contact guard  -     Assistive Device (Sit-Stand Transfers) walker, front-wheeled  -       Row Name 25 1010          Gait/Stairs (Locomotion)    [place] Level (Gait) verbal  cues;contact guard;1 person to manage equipment  -     Assistive Device (Gait) walker, front-wheeled  -     Distance in Feet (Gait) 60  -     Comment, (Gait/Stairs) Assist to manage O2 lines and cues to maintain safe distance within the walker  -       Row Name             Wound 04/04/25 0210 Left lower breast Other (Comments)    Wound - Properties Group Placement Date: 04/04/25  -AT Placement Time: 0210  -AT Present on Original Admission: Y  -AT Side: Left  -AT Orientation: lower  -AT Location: breast  -AT Primary Wound Type: Other  -AT Secondary Wound Type - Other: --  -AT, redness     Retired Wound - Properties Group Placement Date: 04/04/25  -AT Placement Time: 0210  -AT Present on Original Admission: Y  -AT Side: Left  -AT Orientation: lower  -AT Location: breast  -AT    Retired Wound - Properties Group Placement Date: 04/04/25  -AT Placement Time: 0210  -AT Present on Original Admission: Y  -AT Side: Left  -AT Orientation: lower  -AT Location: breast  -AT    Retired Wound - Properties Group Date first assessed: 04/04/25  -AT Time first assessed: 0210  -AT Present on Original Admission: Y  -AT Side: Left  -AT Location: breast  -AT      Row Name             Wound 04/04/25 0217 Other (See comments) coccyx Other (Comments)    Wound - Properties Group Placement Date: 04/04/25  -AT Placement Time: 0217  -AT Present on Original Admission: Y  -AT Side: Other (See comments)  -AT, sacrum  Location: coccyx  -AT Primary Wound Type: Other  -AT, redness     Retired Wound - Properties Group Placement Date: 04/04/25  -AT Placement Time: 0217  -AT Present on Original Admission: Y  -AT Side: Other (See comments)  -AT, sacrum  Location: coccyx  -AT    Retired Wound - Properties Group Placement Date: 04/04/25  -AT Placement Time: 0217  -AT Present on Original Admission: Y  -AT Side: Other (See comments)  -AT, sacrum  Location: coccyx  -AT    Retired Wound - Properties Group Date first assessed: 04/04/25  -AT Time first  assessed: 0217  -AT Present on Original Admission: Y  -AT Side: Other (See comments)  -AT, sacrum  Location: coccyx  -AT      Row Name 04/05/25 1010          Plan of Care Review    Plan of Care Reviewed With patient;family  -     Progress improving  -     Outcome Evaluation PT: Pt up to chair with nsg; sit to/from stand with CGA and cues; pt ambulated 60 ft with FWW (cues to maintain safe distance within walker) and CGA - assist with O2 lines; O2 sats decreased to 87% with gait but recovered once seated and cues for PLB; pt reporting decreased SOA with gait this morning  -       Row Name 04/05/25 1010          Positioning and Restraints    Pre-Treatment Position sitting in chair/recliner  -     Post Treatment Position chair  -     In Chair sitting;call light within reach;encouraged to call for assist;with family/caregiver  -               User Key  (r) = Recorded By, (t) = Taken By, (c) = Cosigned By      Initials Name Provider Type     Atif Rojas PTA Physical Therapist Assistant    AT [facility], John, RN Registered Nurse                    Physical Therapy Education       Title: PT OT SLP Therapies (Done)       Topic: Physical Therapy (Done)       Point: Mobility training (Done)       Learning Progress Summary            Patient Acceptance, E,TB, VU,NR by  at 4/5/2025 1039    Acceptance, E,TB, VU by  at 4/4/2025 1241                                      User Key       Initials Effective Dates Name Provider Type [facility] 02/03/23 -  Atif Rojas PTA Physical Therapist Assistant PT     06/16/21 -  Young Mejia PT Physical Therapist PT                  PT Recommendation and Plan     Plan of Care Reviewed With: patient, family  Progress: improving  Outcome Evaluation: PT: Pt up to chair with nsg; sit to/from stand with CGA and cues; pt ambulated 60 ft with FWW (cues to maintain safe distance within walker) and CGA - assist with O2 lines; O2 sats decreased to 87% with gait but  recovered once seated and cues for PLB; pt reporting decreased SOA with gait this morning   Outcome Measures       Row Name 04/05/25 1010             How much help from another person do you currently need...    Turning from your back to your side while in flat bed without using bedrails? 3  -MH      Moving from lying on back to sitting on the side of a flat bed without bedrails? 3  -MH      Moving to and from a bed to a chair (including a wheelchair)? 3  -MH      Standing up from a chair using your arms (e.g., wheelchair, bedside chair)? 3  -MH      Climbing 3-5 steps with a railing? 3  -MH      To walk in hospital room? 3  -MH      AM-PAC 6 Clicks Score (PT) 18  -MH         Functional Assessment    Outcome Measure Options AM-PAC 6 Clicks Basic Mobility (PT)  -MH                User Key  (r) = Recorded By, (t) = Taken By, (c) = Cosigned By      Initials Name Provider Type     Atif Rojas PTA Physical Therapist Assistant                     Time Calculation:    PT Charges       Row Name 04/05/25 1041 04/05/25 1040          Time Calculation    Start Time -- 1010  -MH     Stop Time -- 1026  -MH     Time Calculation (min) -- 16 min  -MH        Timed Charges    64546 - PT Therapeutic Exercise Minutes 16  -MH --        Total Minutes    Timed Charges Total Minutes 16  -MH --      Total Minutes 16  -MH --               User Key  (r) = Recorded By, (t) = Taken By, (c) = Cosigned By      Initials Name Provider Type     Atif Rojas PTA Physical Therapist Assistant                  Therapy Charges for Today       Code Description Service Date Service Provider Modifiers Qty    79612404306 HC PT THER PROC EA 15 MIN 4/5/2025 Atif Rojas PTA GP 1            PT G-Codes  Outcome Measure Options: AM-PAC 6 Clicks Basic Mobility (PT)  AM-PAC 6 Clicks Score (PT): 18  AM-PAC 6 Clicks Score (OT): 21    Atif Rojas PTA  4/5/2025

## 2025-04-06 LAB
ALBUMIN SERPL-MCNC: 3.1 G/DL (ref 3.5–5.2)
ALBUMIN/GLOB SERPL: 0.8 G/DL
ALP SERPL-CCNC: 98 U/L (ref 39–117)
ALT SERPL W P-5'-P-CCNC: 25 U/L (ref 1–33)
ANION GAP SERPL CALCULATED.3IONS-SCNC: 9.9 MMOL/L (ref 5–15)
AST SERPL-CCNC: 25 U/L (ref 1–32)
BASOPHILS # BLD AUTO: 0.07 10*3/MM3 (ref 0–0.2)
BASOPHILS NFR BLD AUTO: 0.6 % (ref 0–1.5)
BILIRUB SERPL-MCNC: 0.9 MG/DL (ref 0–1.2)
BUN SERPL-MCNC: 14 MG/DL (ref 8–23)
BUN/CREAT SERPL: 18.9 (ref 7–25)
CALCIUM SPEC-SCNC: 9.9 MG/DL (ref 8.6–10.5)
CHLORIDE SERPL-SCNC: 104 MMOL/L (ref 98–107)
CO2 SERPL-SCNC: 25.1 MMOL/L (ref 22–29)
CREAT SERPL-MCNC: 0.74 MG/DL (ref 0.57–1)
DEPRECATED RDW RBC AUTO: 49.7 FL (ref 37–54)
EGFRCR SERPLBLD CKD-EPI 2021: 84 ML/MIN/1.73
EOSINOPHIL # BLD AUTO: 0.25 10*3/MM3 (ref 0–0.4)
EOSINOPHIL NFR BLD AUTO: 2 % (ref 0.3–6.2)
ERYTHROCYTE [DISTWIDTH] IN BLOOD BY AUTOMATED COUNT: 14 % (ref 12.3–15.4)
GLOBULIN UR ELPH-MCNC: 3.7 GM/DL
GLUCOSE BLDC GLUCOMTR-MCNC: 115 MG/DL (ref 70–130)
GLUCOSE BLDC GLUCOMTR-MCNC: 123 MG/DL (ref 70–130)
GLUCOSE BLDC GLUCOMTR-MCNC: 139 MG/DL (ref 70–130)
GLUCOSE BLDC GLUCOMTR-MCNC: 158 MG/DL (ref 70–130)
GLUCOSE SERPL-MCNC: 130 MG/DL (ref 65–99)
HCT VFR BLD AUTO: 50.9 % (ref 34–46.6)
HGB BLD-MCNC: 16.4 G/DL (ref 12–15.9)
IGA1 MFR SER: 526 MG/DL (ref 70–400)
IGG1 SER-MCNC: 1098 MG/DL (ref 700–1600)
IGM SERPL-MCNC: 38 MG/DL (ref 40–230)
IMM GRANULOCYTES # BLD AUTO: 0.08 10*3/MM3 (ref 0–0.05)
IMM GRANULOCYTES NFR BLD AUTO: 0.6 % (ref 0–0.5)
INR PPP: 2.61 (ref 0.9–1.1)
LYMPHOCYTES # BLD AUTO: 2.51 10*3/MM3 (ref 0.7–3.1)
LYMPHOCYTES NFR BLD AUTO: 20.2 % (ref 19.6–45.3)
MCH RBC QN AUTO: 31 PG (ref 26.6–33)
MCHC RBC AUTO-ENTMCNC: 32.2 G/DL (ref 31.5–35.7)
MCV RBC AUTO: 96.2 FL (ref 79–97)
MONOCYTES # BLD AUTO: 0.99 10*3/MM3 (ref 0.1–0.9)
MONOCYTES NFR BLD AUTO: 8 % (ref 5–12)
NEUTROPHILS NFR BLD AUTO: 68.6 % (ref 42.7–76)
NEUTROPHILS NFR BLD AUTO: 8.51 10*3/MM3 (ref 1.7–7)
NRBC BLD AUTO-RTO: 0 /100 WBC (ref 0–0.2)
PLATELET # BLD AUTO: 341 10*3/MM3 (ref 140–450)
PMV BLD AUTO: 8.9 FL (ref 6–12)
POTASSIUM SERPL-SCNC: 4.2 MMOL/L (ref 3.5–5.2)
PROT SERPL-MCNC: 6.8 G/DL (ref 6–8.5)
PROTHROMBIN TIME: 28.6 SECONDS (ref 12.1–15)
RBC # BLD AUTO: 5.29 10*6/MM3 (ref 3.77–5.28)
SODIUM SERPL-SCNC: 139 MMOL/L (ref 136–145)
WBC NRBC COR # BLD AUTO: 12.41 10*3/MM3 (ref 3.4–10.8)

## 2025-04-06 PROCEDURE — 85025 COMPLETE CBC W/AUTO DIFF WBC: CPT | Performed by: INTERNAL MEDICINE

## 2025-04-06 PROCEDURE — 94799 UNLISTED PULMONARY SVC/PX: CPT

## 2025-04-06 PROCEDURE — 99232 SBSQ HOSP IP/OBS MODERATE 35: CPT | Performed by: HOSPITALIST

## 2025-04-06 PROCEDURE — 25010000002 CEFEPIME PER 500 MG: Performed by: STUDENT IN AN ORGANIZED HEALTH CARE EDUCATION/TRAINING PROGRAM

## 2025-04-06 PROCEDURE — 94761 N-INVAS EAR/PLS OXIMETRY MLT: CPT

## 2025-04-06 PROCEDURE — 63710000001 INSULIN LISPRO (HUMAN) PER 5 UNITS: Performed by: STUDENT IN AN ORGANIZED HEALTH CARE EDUCATION/TRAINING PROGRAM

## 2025-04-06 PROCEDURE — 82785 ASSAY OF IGE: CPT | Performed by: INTERNAL MEDICINE

## 2025-04-06 PROCEDURE — 85610 PROTHROMBIN TIME: CPT | Performed by: STUDENT IN AN ORGANIZED HEALTH CARE EDUCATION/TRAINING PROGRAM

## 2025-04-06 PROCEDURE — 80053 COMPREHEN METABOLIC PANEL: CPT | Performed by: INTERNAL MEDICINE

## 2025-04-06 PROCEDURE — 94664 DEMO&/EVAL PT USE INHALER: CPT

## 2025-04-06 PROCEDURE — 82948 REAGENT STRIP/BLOOD GLUCOSE: CPT

## 2025-04-06 RX ORDER — WARFARIN SODIUM 3 MG/1
3 TABLET ORAL
Status: COMPLETED | OUTPATIENT
Start: 2025-04-06 | End: 2025-04-06

## 2025-04-06 RX ORDER — BUDESONIDE AND FORMOTEROL FUMARATE DIHYDRATE 160; 4.5 UG/1; UG/1
2 AEROSOL RESPIRATORY (INHALATION)
Status: DISCONTINUED | OUTPATIENT
Start: 2025-04-06 | End: 2025-04-08 | Stop reason: HOSPADM

## 2025-04-06 RX ORDER — LANOLIN ALCOHOL/MO/W.PET/CERES
1000 CREAM (GRAM) TOPICAL DAILY
Status: DISCONTINUED | OUTPATIENT
Start: 2025-04-06 | End: 2025-04-08 | Stop reason: HOSPADM

## 2025-04-06 RX ADMIN — METOPROLOL TARTRATE 25 MG: 25 TABLET, FILM COATED ORAL at 20:57

## 2025-04-06 RX ADMIN — LACTULOSE 10 G: 20 SOLUTION ORAL at 20:57

## 2025-04-06 RX ADMIN — ACETAMINOPHEN 650 MG: 325 TABLET, FILM COATED ORAL at 16:45

## 2025-04-06 RX ADMIN — Medication 10 ML: at 20:59

## 2025-04-06 RX ADMIN — NYSTATIN: 100000 POWDER TOPICAL at 20:59

## 2025-04-06 RX ADMIN — IPRATROPIUM BROMIDE AND ALBUTEROL SULFATE 3 ML: .5; 3 SOLUTION RESPIRATORY (INHALATION) at 19:43

## 2025-04-06 RX ADMIN — IPRATROPIUM BROMIDE AND ALBUTEROL SULFATE 3 ML: .5; 3 SOLUTION RESPIRATORY (INHALATION) at 09:58

## 2025-04-06 RX ADMIN — LACTULOSE 10 G: 20 SOLUTION ORAL at 08:39

## 2025-04-06 RX ADMIN — ATORVASTATIN CALCIUM 40 MG: 40 TABLET, FILM COATED ORAL at 20:57

## 2025-04-06 RX ADMIN — INSULIN LISPRO 2 UNITS: 100 INJECTION, SOLUTION INTRAVENOUS; SUBCUTANEOUS at 13:06

## 2025-04-06 RX ADMIN — ACETAMINOPHEN 650 MG: 325 TABLET, FILM COATED ORAL at 08:39

## 2025-04-06 RX ADMIN — NYSTATIN: 100000 POWDER TOPICAL at 08:39

## 2025-04-06 RX ADMIN — WARFARIN SODIUM 3 MG: 3 TABLET ORAL at 18:14

## 2025-04-06 RX ADMIN — Medication 10 ML: at 08:39

## 2025-04-06 RX ADMIN — NYSTATIN 1 APPLICATION: 100000 OINTMENT TOPICAL at 20:59

## 2025-04-06 RX ADMIN — NYSTATIN 1 APPLICATION: 100000 OINTMENT TOPICAL at 08:39

## 2025-04-06 RX ADMIN — CEFEPIME 2000 MG: 2 INJECTION, POWDER, FOR SOLUTION INTRAVENOUS at 18:30

## 2025-04-06 RX ADMIN — CEFEPIME 2000 MG: 2 INJECTION, POWDER, FOR SOLUTION INTRAVENOUS at 06:41

## 2025-04-06 RX ADMIN — Medication 1000 MCG: at 11:00

## 2025-04-06 RX ADMIN — METOPROLOL TARTRATE 25 MG: 25 TABLET, FILM COATED ORAL at 08:39

## 2025-04-06 NOTE — PLAN OF CARE
Goal Outcome Evaluation:  Plan of Care Reviewed With: patient        Progress: improving  Outcome Evaluation: Pt VSS, continues tele, afib HR 70's-90's, O2@3.5L, RT titrating, see RT charting.  Pt reports headache improved with po tylenol, see emar, flowsheets.  Pt denies n/v/d, reports tolerating diet. IV antibiotics. Powders and creams apply as ordered to redness under breasts, abd folds and bottom.  Pt up with assist, walker, in chair for meals; using BSC.  Pt resting at this time, family at bedside.

## 2025-04-06 NOTE — PROGRESS NOTES
[facility]      Mar/chart reviewed  Follow up pneumonia and bronchiectasis  Still with some cough productive of sputum not worse    Vital Sign Min/Max for last 24 hours  Temp  Min: 97.8 °F (36.6 °C)  Max: 98.5 °F (36.9 °C)   BP  Min: 132/75  Max: 154/88   Pulse  Min: 77  Max: 91   Resp  Min: 18  Max: 20   SpO2  Min: 91 %  Max: 100 %   Flow (L/min) (Oxygen Therapy)  Min: 3.5  Max: 3.5   No data recorded     Nad, axox3,   perrl, eomi, no icterus,  mmm, no jvd, trachea midline, neck supple,  chest fair ae, coarse  bilaterally, no crackles, + wheezes,   rrr,   soft, nt, nd +bs,  no c/c/e  Skin warm, dry no rashes    Labs: 4/6: reviewed:  Inr 2.6  Glucose 130  Bun 14  Cr 0.74  Bicarb 25  Wbc 12  Hgb 16.4  Plts 341    A/P:  Pneumonia --Can continue cefepime for now, probably can do a total of ten days of antibiotics. Probably use doxy on d/c if cultures remain negative.   Bronchiectasis -- check ig levels(drawn pending). Will probably need better ct when acute pneumonia cleared up.  Continue clearance measures  Chronic hypoxemic respiratory failure  Obesity  Wheezing -- she has h/o allergies, question asthma? -- will start laba/lics given the bronchiectasis anyways.     Will need further outpatient follow up on IG levels, and will need to continue nebulizer and flutter valve bid on d/c.  Will need outpatient pft and repeat ct at somepoint.  Will arrranage follow up in office for next week.

## 2025-04-06 NOTE — PLAN OF CARE
Goal Outcome Evaluation:  Plan of Care Reviewed With: patient        Progress: improving  Outcome Evaluation: Alert and oriented, VS stable with O2 currently at 3.5 LPM. Denies n/v, pain. Tolerating lactulose orally. No complaint made.

## 2025-04-06 NOTE — PROGRESS NOTES
"Hospitalist Team      Patient Care Team:  Francoise Daly MD as PCP - General (Family Medicine)      Chief Complaint:  Follow-up Acute-on-Chronic Respiratory Failure    Subjective    Ms. Merino reports she feels well this afternoon.  She has a good appetite and p.o. intake.  She feels like her breathing is much better.  She denies chest pain.  She is getting up to the bathroom without issue.    Objective    Vital Signs  Temp:  [97.8 °F (36.6 °C)-98.5 °F (36.9 °C)] 97.8 °F (36.6 °C)  Heart Rate:  [77-91] 85  Resp:  [18-20] 18  BP: (132-154)/(71-94) 132/75  Oxygen Therapy  SpO2: 94 %  Pulse Oximetry Type: Continuous  Device (Oxygen Therapy): nasal cannula  Flow (L/min) (Oxygen Therapy): 3.5}    Flowsheet Rows      Flowsheet Row First Filed Value   Admission Height 162.6 cm (64\") Documented at 04/03/2025 2245   Admission Weight 93.4 kg (205 lb 14.4 oz) Documented at 04/03/2025 2245          Physical Exam:    General: Appears stated age in no acute distress.  Lungs: Breath sounds are diminished at the bases.  Otherwise, the breath sounds are clear.  Excursion is normal.  CV: Irregular rate and rhythm.  No murmurs appreciated.  Radial and pedal pulses are 2+ and symmetric.  Abdomen: Obese, soft, and nontender.  Bowel sounds are active.  MSK: No clubbing, cyanosis, or edema.  Neuro: CN II-XII grossly intact.  Psych: Pleasant affect.  Ox3.    Results Review:     I reviewed the patient's new clinical results.    Lab Results (last 24 hours)       Procedure Component Value Units Date/Time    POC Glucose Once [[id number]]  (Normal) Collected: 04/06/25 0818    Specimen: Blood Updated: 04/06/25 0832     Glucose 115 mg/dL     Comprehensive Metabolic Panel [[id number]]  (Abnormal) Collected: 04/06/25 0639    Specimen: Blood Updated: 04/06/25 0710     Glucose 130 mg/dL      BUN 14 mg/dL      Creatinine 0.74 mg/dL      Sodium 139 mmol/L      Potassium 4.2 mmol/L      Chloride 104 mmol/L      CO2 25.1 mmol/L      Calcium 9.9 mg/dL  "     Total Protein 6.8 g/dL      Albumin 3.1 g/dL      ALT (SGPT) 25 U/L      AST (SGOT) 25 U/L      Alkaline Phosphatase 98 U/L      Total Bilirubin 0.9 mg/dL      Globulin 3.7 gm/dL      A/G Ratio 0.8 g/dL      BUN/Creatinine Ratio 18.9     Anion Gap 9.9 mmol/L      eGFR 84.0 mL/min/1.73     Narrative:      GFR Categories in Chronic Kidney Disease (CKD)      GFR Category          GFR (mL/min/1.73)    Interpretation  G1                     90 or greater         Normal or high (1)  G2                      60-89                Mild decrease (1)  G3a                   45-59                Mild to moderate decrease  G3b                   30-44                Moderate to severe decrease  G4                    15-29                Severe decrease  G5                    14 or less           Kidney failure          (1)In the absence of evidence of kidney disease, neither GFR category G1 or G2 fulfill the criteria for CKD.    eGFR calculation 2021 CKD-EPI creatinine equation, which does not include race as a factor    Protime-INR [[id number]]  (Abnormal) Collected: 04/06/25 0639    Specimen: Blood Updated: 04/06/25 0705     Protime 28.6 Seconds      INR 2.61    Narrative:      Therapeutic Ranges for INR: 2.0-3.0 (PT 20-30)                              2.5-3.5 (PT 25-34)    IgG, IgA, IgM [[id number]] Collected: 04/05/25 0559    Specimen: Blood Updated: 04/06/25 0652    CBC & Differential [[id number]]  (Abnormal) Collected: 04/06/25 0639    Specimen: Blood Updated: 04/06/25 0650    Narrative:      The following orders were created for panel order CBC & Differential.  Procedure                               Abnormality         Status                     ---------                               -----------         ------                     CBC Auto Differential[[id number]]        Abnormal            Final result                 Please view results for these tests on the individual orders.    CBC Auto Differential [[id number]]   (Abnormal) Collected: 04/06/25 0639    Specimen: Blood Updated: 04/06/25 0650     WBC 12.41 10*3/mm3      RBC 5.29 10*6/mm3      Hemoglobin 16.4 g/dL      Hematocrit 50.9 %      MCV 96.2 fL      MCH 31.0 pg      MCHC 32.2 g/dL      RDW 14.0 %      RDW-SD 49.7 fl      MPV 8.9 fL      Platelets 341 10*3/mm3      Neutrophil % 68.6 %      Lymphocyte % 20.2 %      Monocyte % 8.0 %      Eosinophil % 2.0 %      Basophil % 0.6 %      Immature Grans % 0.6 %      Neutrophils, Absolute 8.51 10*3/mm3      Lymphocytes, Absolute 2.51 10*3/mm3      Monocytes, Absolute 0.99 10*3/mm3      Eosinophils, Absolute 0.25 10*3/mm3      Basophils, Absolute 0.07 10*3/mm3      Immature Grans, Absolute 0.08 10*3/mm3      nRBC 0.0 /100 WBC     IgE [[id number]] Collected: 04/06/25 0639    Specimen: Blood Updated: 04/06/25 0642    Blood Culture - Blood, Arm, Right [[id number]]  (Normal) Collected: 04/03/25 2311    Specimen: Blood from Arm, Right Updated: 04/05/25 2330     Blood Culture No growth at 2 days    Blood Culture - Blood, Arm, Left [[id number]]  (Normal) Collected: 04/03/25 2311    Specimen: Blood from Arm, Left Updated: 04/05/25 2330     Blood Culture No growth at 2 days    POC Glucose Once [[id number]]  (Abnormal) Collected: 04/05/25 1940    Specimen: Blood Updated: 04/05/25 1946     Glucose 169 mg/dL     POC Glucose Once [[id number]]  (Abnormal) Collected: 04/05/25 1701    Specimen: Blood Updated: 04/05/25 1709     Glucose 174 mg/dL     POC Glucose Once [[id number]]  (Normal) Collected: 04/05/25 1200    Specimen: Blood Updated: 04/05/25 1208     Glucose 114 mg/dL             Imaging Results (Last 24 Hours)       ** No results found for the last 24 hours. **            Medication Review:   I have reviewed the patient's current medication list    Current Facility-Administered Medications:     acetaminophen (TYLENOL) tablet 650 mg, 650 mg, Oral, Q4H PRN, 650 mg at 04/06/25 0839 **OR** acetaminophen (TYLENOL) 160 MG/5ML oral solution 650 mg,  650 mg, Oral, Q4H PRN **OR** acetaminophen (TYLENOL) suppository 650 mg, 650 mg, Rectal, Q4H PRN, Tristin Galarza MD    albuterol (PROVENTIL) nebulizer solution 0.083% 2.5 mg/3mL, 2.5 mg, Nebulization, Q4H PRN, Tristin Galarza MD    atorvastatin (LIPITOR) tablet 40 mg, 40 mg, Oral, Nightly, Tristin Galarza MD, 40 mg at 04/05/25 2001    sennosides-docusate (PERICOLACE) 8.6-50 MG per tablet 2 tablet, 2 tablet, Oral, BID PRN **AND** polyethylene glycol (MIRALAX) packet 17 g, 17 g, Oral, Daily PRN **AND** bisacodyl (DULCOLAX) EC tablet 5 mg, 5 mg, Oral, Daily PRN **AND** bisacodyl (DULCOLAX) suppository 10 mg, 10 mg, Rectal, Daily PRN, Tristin Galarza MD    budesonide-formoterol (SYMBICORT) 160-4.5 MCG/ACT inhaler 2 puff, 2 puff, Inhalation, BID - RT, Mello Haas MD    cefepime 2000 mg IVPB in 100 mL NS (VTB), 2,000 mg, Intravenous, Q12H, Tristin Galarza MD, 2,000 mg at 04/06/25 0641    dextrose (D50W) (25 g/50 mL) IV injection 25 g, 25 g, Intravenous, Q15 Min PRN, Tristin Galarza MD    dextrose (GLUTOSE) oral gel 15 g, 15 g, Oral, Q15 Min PRN, Tristin Galarza MD    glucagon (GLUCAGEN) injection 1 mg, 1 mg, Intramuscular, Q15 Min PRN, Tristin Galarza MD    Insulin Lispro (humaLOG) injection 2-7 Units, 2-7 Units, Subcutaneous, 4x Daily AC & at Bedtime, Tristin Galarza MD, 2 Units at 04/05/25 2001    ipratropium-albuterol (DUO-NEB) nebulizer solution 3 mL, 3 mL, Nebulization, BID - RT, Tristin Galarza MD, 3 mL at 04/06/25 0958    lactulose (CHRONULAC) 10 GM/15ML solution 10 g, 10 g, Oral, BID, Tristin Galarza MD, 10 g at 04/06/25 0839    metoprolol tartrate (LOPRESSOR) tablet 25 mg, 25 mg, Oral, Q12H, Tristin Galarza MD, 25 mg at 04/06/25 0839    nitroglycerin (NITROSTAT) SL tablet 0.4 mg, 0.4 mg, Sublingual, Q5 Min PRN, Tristin Galarza MD    nystatin (MYCOSTATIN) ointment 1 Application, 1 Application, Topical, Q12H, Tristin Galarza MD, 1 Application at 04/06/25 0839    nystatin (MYCOSTATIN) powder, , Topical, Q12H, Tristin Galarza  MD, Given at 04/06/25 0839    Pharmacy to dose warfarin, , Not Applicable, Continuous PRN, Tristin Galarza MD    Pharmacy To Dose: Cefepime, , Not Applicable, Continuous PRN, Tristin Galarza MD    sodium chloride 0.9 % flush 10 mL, 10 mL, Intravenous, PRN, Kory Kim MD    sodium chloride 0.9 % flush 10 mL, 10 mL, Intravenous, PRN, Kory Kim MD    sodium chloride 0.9 % flush 10 mL, 10 mL, Intravenous, Q12H, Tristin Galarza MD, 10 mL at 04/06/25 0839    sodium chloride 0.9 % flush 10 mL, 10 mL, Intravenous, PRN, Tristin Galarza MD    sodium chloride 0.9 % infusion 40 mL, 40 mL, Intravenous, PRN, Tristin Galarza MD    vitamin B-12 (CYANOCOBALAMIN) tablet 1,000 mcg, 1,000 mcg, Oral, Daily, Duc Carrillo,     vitamin B-12 (CYANOCOBALAMIN) tablet 100 mcg, 100 mcg, Oral, Daily, Tristin Galarza MD      Assessment & Plan     Acute-on-chronic hypoxic respiratory failure due to multifocal pneumonia/bronchiectasis: I have weaned her down to her baseline O2 at 2 L.  Discussed with Dr. Good.  Chronic atrial fibrillation: Rate trend remains at goal.  Note made of change to Toprol XL dosing.  Continue Lipitor.  Appreciate pharmacy's help with dosing warfarin.  INR at goal.  Diabetes mellitus, type II in obese: Diet controlled.  Glucose trend at goal.  No change to medication.  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  No planned intervention.    Plan for disposition: Predicated on hospital course.    Gonzalez Messina MD  04/06/25  10:23 EDT

## 2025-04-07 LAB
CREAT SERPL-MCNC: 0.6 MG/DL (ref 0.57–1)
EGFRCR SERPLBLD CKD-EPI 2021: 93.2 ML/MIN/1.73
GLUCOSE BLDC GLUCOMTR-MCNC: 114 MG/DL (ref 70–130)
GLUCOSE BLDC GLUCOMTR-MCNC: 126 MG/DL (ref 70–130)
GLUCOSE BLDC GLUCOMTR-MCNC: 164 MG/DL (ref 70–130)
GLUCOSE BLDC GLUCOMTR-MCNC: 216 MG/DL (ref 70–130)
INR PPP: 2.1 (ref 0.9–1.1)
PROTHROMBIN TIME: 24.1 SECONDS (ref 12.1–15)

## 2025-04-07 PROCEDURE — 94664 DEMO&/EVAL PT USE INHALER: CPT

## 2025-04-07 PROCEDURE — 63710000001 INSULIN LISPRO (HUMAN) PER 5 UNITS: Performed by: STUDENT IN AN ORGANIZED HEALTH CARE EDUCATION/TRAINING PROGRAM

## 2025-04-07 PROCEDURE — 94761 N-INVAS EAR/PLS OXIMETRY MLT: CPT

## 2025-04-07 PROCEDURE — 82948 REAGENT STRIP/BLOOD GLUCOSE: CPT

## 2025-04-07 PROCEDURE — 25010000002 METHYLPREDNISOLONE PER 40 MG: Performed by: INTERNAL MEDICINE

## 2025-04-07 PROCEDURE — 94799 UNLISTED PULMONARY SVC/PX: CPT

## 2025-04-07 PROCEDURE — 85610 PROTHROMBIN TIME: CPT | Performed by: STUDENT IN AN ORGANIZED HEALTH CARE EDUCATION/TRAINING PROGRAM

## 2025-04-07 PROCEDURE — 99232 SBSQ HOSP IP/OBS MODERATE 35: CPT | Performed by: HOSPITALIST

## 2025-04-07 PROCEDURE — 82565 ASSAY OF CREATININE: CPT | Performed by: STUDENT IN AN ORGANIZED HEALTH CARE EDUCATION/TRAINING PROGRAM

## 2025-04-07 PROCEDURE — 25010000002 CEFEPIME PER 500 MG: Performed by: STUDENT IN AN ORGANIZED HEALTH CARE EDUCATION/TRAINING PROGRAM

## 2025-04-07 RX ORDER — OXYMETAZOLINE HYDROCHLORIDE 0.05 G/100ML
2 SPRAY NASAL 2 TIMES DAILY
Status: DISCONTINUED | OUTPATIENT
Start: 2025-04-07 | End: 2025-04-08 | Stop reason: HOSPADM

## 2025-04-07 RX ORDER — WARFARIN SODIUM 7.5 MG/1
7.5 TABLET ORAL 2 TIMES WEEKLY
Status: DISCONTINUED | OUTPATIENT
Start: 2025-04-10 | End: 2025-04-08 | Stop reason: HOSPADM

## 2025-04-07 RX ORDER — METHYLPREDNISOLONE SODIUM SUCCINATE 40 MG/ML
40 INJECTION, POWDER, LYOPHILIZED, FOR SOLUTION INTRAMUSCULAR; INTRAVENOUS EVERY 8 HOURS
Status: DISCONTINUED | OUTPATIENT
Start: 2025-04-07 | End: 2025-04-08

## 2025-04-07 RX ORDER — WARFARIN SODIUM 5 MG/1
5 TABLET ORAL
Status: DISCONTINUED | OUTPATIENT
Start: 2025-04-08 | End: 2025-04-08 | Stop reason: HOSPADM

## 2025-04-07 RX ORDER — IPRATROPIUM BROMIDE AND ALBUTEROL SULFATE 2.5; .5 MG/3ML; MG/3ML
3 SOLUTION RESPIRATORY (INHALATION)
Status: DISCONTINUED | OUTPATIENT
Start: 2025-04-07 | End: 2025-04-08 | Stop reason: HOSPADM

## 2025-04-07 RX ORDER — WARFARIN SODIUM 3 MG/1
6 TABLET ORAL
Status: COMPLETED | OUTPATIENT
Start: 2025-04-07 | End: 2025-04-07

## 2025-04-07 RX ADMIN — METHYLPREDNISOLONE SODIUM SUCCINATE 40 MG: 40 INJECTION, POWDER, FOR SOLUTION INTRAMUSCULAR; INTRAVENOUS at 22:19

## 2025-04-07 RX ADMIN — CEFEPIME 2000 MG: 2 INJECTION, POWDER, FOR SOLUTION INTRAVENOUS at 12:33

## 2025-04-07 RX ADMIN — INSULIN LISPRO 3 UNITS: 100 INJECTION, SOLUTION INTRAVENOUS; SUBCUTANEOUS at 20:51

## 2025-04-07 RX ADMIN — Medication 10 ML: at 21:34

## 2025-04-07 RX ADMIN — NYSTATIN: 100000 POWDER TOPICAL at 08:28

## 2025-04-07 RX ADMIN — METOPROLOL TARTRATE 25 MG: 25 TABLET, FILM COATED ORAL at 08:28

## 2025-04-07 RX ADMIN — Medication 10 ML: at 08:28

## 2025-04-07 RX ADMIN — NYSTATIN 1 APPLICATION: 100000 OINTMENT TOPICAL at 21:33

## 2025-04-07 RX ADMIN — INSULIN LISPRO 2 UNITS: 100 INJECTION, SOLUTION INTRAVENOUS; SUBCUTANEOUS at 12:19

## 2025-04-07 RX ADMIN — WARFARIN SODIUM 6 MG: 3 TABLET ORAL at 18:58

## 2025-04-07 RX ADMIN — CEFEPIME 2000 MG: 2 INJECTION, POWDER, FOR SOLUTION INTRAVENOUS at 20:51

## 2025-04-07 RX ADMIN — Medication 1000 MCG: at 08:28

## 2025-04-07 RX ADMIN — IPRATROPIUM BROMIDE AND ALBUTEROL SULFATE 3 ML: .5; 3 SOLUTION RESPIRATORY (INHALATION) at 22:53

## 2025-04-07 RX ADMIN — METOPROLOL TARTRATE 25 MG: 25 TABLET, FILM COATED ORAL at 20:51

## 2025-04-07 RX ADMIN — METHYLPREDNISOLONE SODIUM SUCCINATE 40 MG: 40 INJECTION, POWDER, FOR SOLUTION INTRAMUSCULAR; INTRAVENOUS at 16:36

## 2025-04-07 RX ADMIN — IPRATROPIUM BROMIDE AND ALBUTEROL SULFATE 3 ML: .5; 3 SOLUTION RESPIRATORY (INHALATION) at 15:06

## 2025-04-07 RX ADMIN — NYSTATIN 1 APPLICATION: 100000 OINTMENT TOPICAL at 08:28

## 2025-04-07 RX ADMIN — BUDESONIDE AND FORMOTEROL FUMARATE DIHYDRATE 2 PUFF: 160; 4.5 AEROSOL RESPIRATORY (INHALATION) at 10:25

## 2025-04-07 RX ADMIN — IPRATROPIUM BROMIDE AND ALBUTEROL SULFATE 3 ML: .5; 3 SOLUTION RESPIRATORY (INHALATION) at 10:20

## 2025-04-07 RX ADMIN — ATORVASTATIN CALCIUM 40 MG: 40 TABLET, FILM COATED ORAL at 20:51

## 2025-04-07 RX ADMIN — IPRATROPIUM BROMIDE AND ALBUTEROL SULFATE 3 ML: .5; 3 SOLUTION RESPIRATORY (INHALATION) at 18:46

## 2025-04-07 RX ADMIN — BUDESONIDE AND FORMOTEROL FUMARATE DIHYDRATE 2 PUFF: 160; 4.5 AEROSOL RESPIRATORY (INHALATION) at 19:03

## 2025-04-07 RX ADMIN — LACTULOSE 10 G: 20 SOLUTION ORAL at 20:51

## 2025-04-07 RX ADMIN — Medication 2 SPRAY: at 18:58

## 2025-04-07 RX ADMIN — CEFEPIME 2000 MG: 2 INJECTION, POWDER, FOR SOLUTION INTRAVENOUS at 06:33

## 2025-04-07 RX ADMIN — LACTULOSE 10 G: 20 SOLUTION ORAL at 08:28

## 2025-04-07 RX ADMIN — Medication 2 SPRAY: at 21:33

## 2025-04-07 RX ADMIN — NYSTATIN 1 APPLICATION: 100000 POWDER TOPICAL at 21:33

## 2025-04-07 NOTE — THERAPY DISCHARGE NOTE
Acute Care - Physical Therapy Discharge Summary  [facility]       Patient Name: Nikki Merino  : 1950  MRN: [id number]    Today's Date: 2025                 Admit Date: 4/3/2025    Patient has been ambulating multiple times yesterday and today with staff.  Patient reports current mobility has returned to baseline and expresses no concerns regarding returning home.  Patient states she has a rolling walker at home and is agreeable to use, declines need for home health at this time.  Recommend continued ambulation with staff, no additional skilled PT needs at this time.                           Mini Brown, PT   2025

## 2025-04-07 NOTE — PROGRESS NOTES
[facility]  [phone]  Dr. Eliecer Gilmore    Subjective:  LOS: 3    Chief Complaint: Pneumonia    Sitting in a chair.  Feels much better since admission.  Still with wheezing.    Objective   Vital Signs past 24hrs  Temp range: Temp (24hrs), Av.3 °F (36.3 °C), Min:97.1 °F (36.2 °C), Max:97.5 °F (36.4 °C)    BP range: BP: (123-159)/(70-88) 139/80  Pulse range: Heart Rate:  [61-85] 71  Resp rate range: Resp:  [18-22] 20  Device (Oxygen Therapy): nasal cannulaFlow (L/min) (Oxygen Therapy):  [2.5-3.5] 2.5  Oxygen range:SpO2:  [91 %-99 %] 95 %   Mechanical Ventilator:     Physical Exam  Constitutional:       Appearance: She is obese.   Eyes:      Pupils: Pupils are equal, round, and reactive to light.   Cardiovascular:      Rate and Rhythm: Normal rate and regular rhythm.      Heart sounds: No murmur heard.  Pulmonary:      Effort: Pulmonary effort is normal.      Breath sounds: Wheezing present.   Abdominal:      General: Bowel sounds are normal.      Palpations: Abdomen is soft. There is no mass.      Tenderness: There is no abdominal tenderness.   Musculoskeletal:         General: No swelling.   Neurological:      Mental Status: She is alert.       Results Review:    I have reviewed the laboratory and imaging data since the last note by [facility] physician.  My annotations are noted in assessment and plan.      Result Review:  I have personally reviewed the results from last note by [facility] physician to 2025 07:21 EDT and agree with these findings:  [x]  Laboratory list / accordion  [x]  Microbiology  [x]  Radiology  []  EKG/Telemetry   []  Cardiology/Vascular   []  Pathology  []  Old records  []  Other:      Medication Review:  I have reviewed the current MAR.  My annotations are noted in assessment and plan.    atorvastatin, 40 mg, Oral, Nightly  budesonide-formoterol, 2 puff, Inhalation, BID - RT  cefepime, 2,000 mg, Intravenous, Q8H  insulin lispro, 2-7 Units, Subcutaneous, 4x Daily AC & at  Bedtime  ipratropium-albuterol, 3 mL, Nebulization, BID - RT  lactulose, 10 g, Oral, BID  metoprolol tartrate, 25 mg, Oral, Q12H  nystatin, 1 Application, Topical, Q12H  nystatin, , Topical, Q12H  sodium chloride, 10 mL, Intravenous, Q12H  vitamin B-12, 1,000 mcg, Oral, Daily  vitamin B-12, 100 mcg, Oral, Daily        Pharmacy to dose warfarin,   Pharmacy To Dose:,       Lines, Drains & Airways       Active LDAs       Name Placement date Placement time Site Days    Peripheral IV 04/06/25 0641 Anterior;Left Forearm 04/06/25 0641  Forearm  1                    PCCM Problems  Pneumonia  Wheezing  Bronchiectasis  Chronic nocturnal hypoxia  Obesity  Relevant Medical Diagnoses  A-fib on anticoagulation  Type 2 diabetes mellitus  Breast mass      THESE ARE NEW MEDICAL PROBLEMS TO ME.    Plan of Treatment    Continue treatment for pneumonia.  Cultures pending and MRSA screen is negative.  Requires follow-up chest imaging to ensure infiltrates have cleared.     Wheezing on exam.  Will give IV steroids.  When patient is ready for discharge can likely switch to p.o. prednisone and taper.    Bronchiectasis and further workup outpatient.  IgA levels are elevated but there is no evidence of vasculitis or renal insufficiency    She has appointment to see Dr. Mello Haas in the [facility] office.    Eliecer Gilmore MD  04/07/25  07:21 EDT    Isolation status: No active isolations    Dietary Orders (From admission, onward)       Start     Ordered    04/04/25 0133  Diet: Diabetic; Consistent Carbohydrate; Fluid Consistency: Thin (IDDSI 0)  Diet Effective Now        References:    Diet Order Definitions   Question Answer Comment   Diets: Diabetic    Diabetic Diet: Consistent Carbohydrate    Fluid Consistency: Thin (IDDSI 0)        04/04/25 0133                    Part of this note may be an electronic transcription/translation of spoken language to printed text using the Dragon Dictation System.

## 2025-04-07 NOTE — PROGRESS NOTES
Pharmacy Consult to Dose Coumadin     74yoF with AFib/Flutter    On Coumadin PTA  Goal 2-3    INR = 2.1    Will give one time dose of 6mg this evening.  Pharmacy will continue to monitor and write orders.    Thank-you,  Mariel Sol [facility].  04/07/25  07:34 EDT.

## 2025-04-07 NOTE — PROGRESS NOTES
Adult Nutrition  Assessment/PES    Patient Name:  Nikki Merino  YOB: 1950  MRN: [id number]  Admit Date:  4/3/2025    Assessment Date:  4/7/2025    Comments:  follow up    Po 71% ave of meals    Encourage cont good po  Will cont to follow and monitor.      Reason for Assessment       Row Name 04/07/25 1047          Reason for Assessment    Reason For Assessment follow-up protocol     Diagnosis infection/sepsis;pulmonary disease  acute on chronic resp failure, DM                    Nutrition/Diet History       Row Name 04/07/25 1047          Nutrition/Diet History    Typical Intake (Food/Fluid/EN/PN) Pt up in chair, reports good po and no needs                    Labs/Tests/Procedures/Meds       Row Name 04/07/25 1047          Labs/Procedures/Meds    Lab Results Reviewed reviewed     Lab Results Comments glu 139        Diagnostic Tests/Procedures    Diagnostic Test/Procedure Reviewed reviewed        Medications    Pertinent Medications Reviewed reviewed     Pertinent Medications Comments B12, coumadin, B12, humalog, lactulose                        Nutrition Prescription Ordered       Row Name 04/07/25 1047          Nutrition Prescription PO    Common Modifiers Consistent Carbohydrate                    Evaluation of Received Nutrient/Fluid Intake       Row Name 04/07/25 1047          Fluid Intake Evaluation    Oral Fluid (mL) 993  ave x 3        PO Evaluation    Number of Meals 6     % PO Intake 71                       Problem/Interventions:   Problem 1       Row Name 04/07/25 1048          Nutrition Diagnoses Problem 1    Problem 1 Other (comment)  Inadequate energy intake relatged to resp failure as evidenced by decreased appetite     Resolved? Yes                          Intervention Goal       Row Name 04/07/25 1048          Intervention Goal    General Meet nutritional needs for age/condition     PO Maintain intake;PO intake (%)     PO Intake % 70 %                    Nutrition Intervention        Row Name 04/07/25 1048          Nutrition Intervention    RD/Tech Action Follow Tx progress                      Education/Evaluation       Row Name 04/07/25 1048          Education    Education Previous education by RD/AMANDA        Monitor/Evaluation    Monitor Per protocol;I&O;PO intake;Pertinent labs;Weight                     Electronically signed by:  Shaunna Cain RD  04/07/25 10:48 EDT

## 2025-04-07 NOTE — PLAN OF CARE
Goal Outcome Evaluation:  Plan of Care Reviewed With: patient        Progress: improving  Outcome Evaluation: VS stable, currently on 2.5LPM O2, RT titrating to patient's baseline O2 at 2LPM. Patient ambulates to the bathroom, assist x2 with a walker, no SOA reported. Occasional coughing noted. IV antbx as ordered. Denies pain, n/v.

## 2025-04-07 NOTE — PROGRESS NOTES
Pharmacy to Dose Cefepime for PNA    SCr=0.6  Est CrCl =88.8ml/min    Increased dose for renal function. CrCl=/>60 dose at 8 hour interval.    Thank-you,  Mariel Sol [facility].  04/07/25  06:47 EDT.

## 2025-04-07 NOTE — PROGRESS NOTES
"Hospitalist Team      Patient Care Team:  Francoise Daly MD as PCP - General (Family Medicine)        Chief Complaint:  Follow-up  Resp Failure    Subjective    Continues to do well although she did develop a nosebleed earlier.  That is now been stabilized.  She denies chest pain and dyspnea.  She is tolerating p.o.    Objective    Vital Signs  Temp:  [97.1 °F (36.2 °C)-98 °F (36.7 °C)] 98 °F (36.7 °C)  Heart Rate:  [61-82] 73  Resp:  [18-22] 18  BP: (123-159)/(70-88) 147/73  Oxygen Therapy  SpO2: 95 %  Pulse Oximetry Type: Continuous  Device (Oxygen Therapy): nasal cannula  Flow (L/min) (Oxygen Therapy): 2}    Flowsheet Rows      Flowsheet Row First Filed Value   Admission Height 162.6 cm (64\") Documented at 04/03/2025 2245   Admission Weight 93.4 kg (205 lb 14.4 oz) Documented at 04/03/2025 2245          Physical Exam:    General: Appears stated age in no acute distress.  Lungs: Sonorous rhonchi appreciated over the right hemithorax.  Left is clear.  Respirations are nonlabored.  CV: Irregularly irregular rate and rhythm.  No murmur appreciated.  Radial pulses 2+ symmetric.  Abdomen: Obese, soft, nontender.  Bowel sounds are active.  Neuro: CN II-XII grossly.  Psych: Affect.  [place] x 3.    Results Review:     I reviewed the patient's new clinical results.    Lab Results (last 24 hours)       Procedure Component Value Units Date/Time    POC Glucose Once [[id number]]  (Abnormal) Collected: 04/07/25 1147    Specimen: Blood Updated: 04/07/25 1154     Glucose 164 mg/dL     POC Glucose Once [[id number]]  (Normal) Collected: 04/07/25 0824    Specimen: Blood Updated: 04/07/25 0831     Glucose 114 mg/dL     Creatinine Serum (kidney function) GFR component [[id number]]  (Normal) Collected: 04/07/25 0340    Specimen: Blood Updated: 04/07/25 0445     Creatinine 0.60 mg/dL      eGFR 93.2 mL/min/1.73     Narrative:      GFR Categories in Chronic Kidney Disease (CKD)      GFR Category          GFR (mL/min/1.73)    " Interpretation  G1                     90 or greater         Normal or high (1)  G2                      60-89                Mild decrease (1)  G3a                   45-59                Mild to moderate decrease  G3b                   30-44                Moderate to severe decrease  G4                    15-29                Severe decrease  G5                    14 or less           Kidney failure          (1)In the absence of evidence of kidney disease, neither GFR category G1 or G2 fulfill the criteria for CKD.    eGFR calculation 2021 CKD-EPI creatinine equation, which does not include race as a factor    Protime-INR [[id number]]  (Abnormal) Collected: 04/07/25 0340    Specimen: Blood Updated: 04/07/25 0443     Protime 24.1 Seconds      INR 2.10    Narrative:      Therapeutic Ranges for INR: 2.0-3.0 (PT 20-30)                              2.5-3.5 (PT 25-34)    Blood Culture - Blood, Arm, Right [[id number]]  (Normal) Collected: 04/03/25 2311    Specimen: Blood from Arm, Right Updated: 04/06/25 2330     Blood Culture No growth at 3 days    Blood Culture - Blood, Arm, Left [[id number]]  (Normal) Collected: 04/03/25 2311    Specimen: Blood from Arm, Left Updated: 04/06/25 2330     Blood Culture No growth at 3 days    POC Glucose Once [[id number]]  (Abnormal) Collected: 04/06/25 2033    Specimen: Blood Updated: 04/06/25 2040     Glucose 139 mg/dL     POC Glucose Once [[id number]]  (Normal) Collected: 04/06/25 1711    Specimen: Blood Updated: 04/06/25 1725     Glucose 123 mg/dL             Imaging Results (Last 24 Hours)       ** No results found for the last 24 hours. **            Medication Review:   I have reviewed the patient's current medication list    Current Facility-Administered Medications:     acetaminophen (TYLENOL) tablet 650 mg, 650 mg, Oral, Q4H PRN, 650 mg at 04/06/25 1645 **OR** acetaminophen (TYLENOL) 160 MG/5ML oral solution 650 mg, 650 mg, Oral, Q4H PRN **OR** acetaminophen (TYLENOL) suppository 650  mg, 650 mg, Rectal, Q4H PRN, Tristin Galarza MD    albuterol (PROVENTIL) nebulizer solution 0.083% 2.5 mg/3mL, 2.5 mg, Nebulization, Q4H PRN, Tristin Galarza MD    atorvastatin (LIPITOR) tablet 40 mg, 40 mg, Oral, Nightly, Tristin Galarza MD, 40 mg at 04/06/25 2057    sennosides-docusate (PERICOLACE) 8.6-50 MG per tablet 2 tablet, 2 tablet, Oral, BID PRN **AND** polyethylene glycol (MIRALAX) packet 17 g, 17 g, Oral, Daily PRN **AND** bisacodyl (DULCOLAX) EC tablet 5 mg, 5 mg, Oral, Daily PRN **AND** bisacodyl (DULCOLAX) suppository 10 mg, 10 mg, Rectal, Daily PRN, Tristin Galarza MD    budesonide-formoterol (SYMBICORT) 160-4.5 MCG/ACT inhaler 2 puff, 2 puff, Inhalation, BID - RT, Mello Haas MD, 2 puff at 04/07/25 1025    cefepime 2000 mg IVPB in 100 mL NS (VTB), 2,000 mg, Intravenous, Q8H, Tristin Galarza MD    dextrose (D50W) (25 g/50 mL) IV injection 25 g, 25 g, Intravenous, Q15 Min PRN, Tristin Galarza MD    dextrose (GLUTOSE) oral gel 15 g, 15 g, Oral, Q15 Min PRN, Tristin Galarza MD    glucagon (GLUCAGEN) injection 1 mg, 1 mg, Intramuscular, Q15 Min PRN, Tristin Galarza MD    Insulin Lispro (humaLOG) injection 2-7 Units, 2-7 Units, Subcutaneous, 4x Daily AC & at Bedtime, Tristin Galarza MD, 2 Units at 04/06/25 1306    ipratropium-albuterol (DUO-NEB) nebulizer solution 3 mL, 3 mL, Nebulization, BID - RT, Tristin Galarza MD, 3 mL at 04/07/25 1020    lactulose (CHRONULAC) 10 GM/15ML solution 10 g, 10 g, Oral, BID, Tristin Galarza MD, 10 g at 04/07/25 0828    metoprolol tartrate (LOPRESSOR) tablet 25 mg, 25 mg, Oral, Q12H, Tristin Galarza MD, 25 mg at 04/07/25 0828    nitroglycerin (NITROSTAT) SL tablet 0.4 mg, 0.4 mg, Sublingual, Q5 Min PRN, Tristin Galarza MD    nystatin (MYCOSTATIN) ointment 1 Application, 1 Application, Topical, Q12H, Tristin Galarza MD, 1 Application at 04/07/25 0828    nystatin (MYCOSTATIN) powder, , Topical, Q12H, Tristin Galarza MD, Given at 04/07/25 0828    Pharmacy to dose warfarin, , Not  Applicable, Continuous PRN, Tristin Galarza MD    Pharmacy To Dose: Cefepime, , Not Applicable, Continuous PRN, Tristin Galarza MD    sodium chloride 0.9 % flush 10 mL, 10 mL, Intravenous, PRN, Kory Kim MD    sodium chloride 0.9 % flush 10 mL, 10 mL, Intravenous, PRN, Kory Kim MD    sodium chloride 0.9 % flush 10 mL, 10 mL, Intravenous, Q12H, Tristin Galarza MD, 10 mL at 04/07/25 0828    sodium chloride 0.9 % flush 10 mL, 10 mL, Intravenous, PRN, Tristin Galarza MD    sodium chloride 0.9 % infusion 40 mL, 40 mL, Intravenous, PRN, Tristin Galarza MD    vitamin B-12 (CYANOCOBALAMIN) tablet 1,000 mcg, 1,000 mcg, Oral, Daily, Duc Carrillo DO, 1,000 mcg at 04/07/25 0828    vitamin B-12 (CYANOCOBALAMIN) tablet 100 mcg, 100 mcg, Oral, Daily, Tristin Galarza MD    warfarin (COUMADIN) tablet 6 mg, 6 mg, Oral, Once, Tristin Galarza MD      Assessment & Plan     Acute-on-chronic hypoxic respiratory failure due to multifocal pneumonia/bronchiectasis: Monitor off of oxygen.  Ms. Merino does report that she sometimes just wears oxygen at night.  Appreciate pulmonary's help.  Chronic atrial fibrillation: No acute issues.  Continue to monitor on home regimen.  Diabetes mellitus, type II in obese: Glucose trend good continue to monitor.  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  No planned intervention.    Plan for disposition: A.D. in A..    Gonzalez Messina MD  04/07/25  13:10 EDT

## 2025-04-07 NOTE — PLAN OF CARE
Goal Outcome Evaluation:  Plan of Care Reviewed With: patient, spouse        Progress: improving  Outcome Evaluation: Pt VSS, tele d/c'ed. O2 titrated to room air while awake. IV antibiotics, iv steroids. Pt reports pain improved with po tylenol, see emar, flowsheets. Pt denies n/v/d, reports tolerating diet.Powders and creams apply as ordered to redness under breasts, abd folds and bottom. Pt up with assist, walker; up in chair for meals.  Pt resting at this time, family at bedside.

## 2025-04-08 ENCOUNTER — READMISSION MANAGEMENT (OUTPATIENT)
Dept: CALL CENTER | Facility: HOSPITAL | Age: 75
End: 2025-04-08
Payer: MEDICARE

## 2025-04-08 VITALS
HEIGHT: 64 IN | RESPIRATION RATE: 20 BRPM | HEART RATE: 84 BPM | SYSTOLIC BLOOD PRESSURE: 152 MMHG | BODY MASS INDEX: 35.42 KG/M2 | TEMPERATURE: 97 F | DIASTOLIC BLOOD PRESSURE: 88 MMHG | WEIGHT: 207.45 LBS | OXYGEN SATURATION: 92 %

## 2025-04-08 LAB
ANION GAP SERPL CALCULATED.3IONS-SCNC: 13.7 MMOL/L (ref 5–15)
BACTERIA SPEC AEROBE CULT: NORMAL
BACTERIA SPEC AEROBE CULT: NORMAL
BASOPHILS # BLD AUTO: 0.02 10*3/MM3 (ref 0–0.2)
BASOPHILS NFR BLD AUTO: 0.2 % (ref 0–1.5)
BUN SERPL-MCNC: 16 MG/DL (ref 8–23)
BUN/CREAT SERPL: 23.2 (ref 7–25)
CALCIUM SPEC-SCNC: 9.6 MG/DL (ref 8.6–10.5)
CHLORIDE SERPL-SCNC: 103 MMOL/L (ref 98–107)
CO2 SERPL-SCNC: 22.3 MMOL/L (ref 22–29)
CREAT SERPL-MCNC: 0.69 MG/DL (ref 0.57–1)
DEPRECATED RDW RBC AUTO: 46.4 FL (ref 37–54)
EGFRCR SERPLBLD CKD-EPI 2021: 89.5 ML/MIN/1.73
EOSINOPHIL # BLD AUTO: 0 10*3/MM3 (ref 0–0.4)
EOSINOPHIL NFR BLD AUTO: 0 % (ref 0.3–6.2)
ERYTHROCYTE [DISTWIDTH] IN BLOOD BY AUTOMATED COUNT: 13.5 % (ref 12.3–15.4)
GLUCOSE BLDC GLUCOMTR-MCNC: 225 MG/DL (ref 70–130)
GLUCOSE BLDC GLUCOMTR-MCNC: 251 MG/DL (ref 70–130)
GLUCOSE SERPL-MCNC: 266 MG/DL (ref 65–99)
HCT VFR BLD AUTO: 51 % (ref 34–46.6)
HGB BLD-MCNC: 16.7 G/DL (ref 12–15.9)
IMM GRANULOCYTES # BLD AUTO: 0.06 10*3/MM3 (ref 0–0.05)
IMM GRANULOCYTES NFR BLD AUTO: 0.7 % (ref 0–0.5)
INR PPP: 1.67 (ref 0.9–1.1)
LYMPHOCYTES # BLD AUTO: 1.33 10*3/MM3 (ref 0.7–3.1)
LYMPHOCYTES NFR BLD AUTO: 14.4 % (ref 19.6–45.3)
MCH RBC QN AUTO: 30.8 PG (ref 26.6–33)
MCHC RBC AUTO-ENTMCNC: 32.7 G/DL (ref 31.5–35.7)
MCV RBC AUTO: 94.1 FL (ref 79–97)
MONOCYTES # BLD AUTO: 0.07 10*3/MM3 (ref 0.1–0.9)
MONOCYTES NFR BLD AUTO: 0.8 % (ref 5–12)
NEUTROPHILS NFR BLD AUTO: 7.74 10*3/MM3 (ref 1.7–7)
NEUTROPHILS NFR BLD AUTO: 83.9 % (ref 42.7–76)
NRBC BLD AUTO-RTO: 0 /100 WBC (ref 0–0.2)
PLATELET # BLD AUTO: 223 10*3/MM3 (ref 140–450)
PMV BLD AUTO: 10.7 FL (ref 6–12)
POTASSIUM SERPL-SCNC: 4.2 MMOL/L (ref 3.5–5.2)
PROTHROMBIN TIME: 20.2 SECONDS (ref 12.1–15)
RBC # BLD AUTO: 5.42 10*6/MM3 (ref 3.77–5.28)
RBC MORPH BLD: NORMAL
SMALL PLATELETS BLD QL SMEAR: ADEQUATE
SODIUM SERPL-SCNC: 139 MMOL/L (ref 136–145)
WBC MORPH BLD: NORMAL
WBC NRBC COR # BLD AUTO: 9.22 10*3/MM3 (ref 3.4–10.8)

## 2025-04-08 PROCEDURE — 94799 UNLISTED PULMONARY SVC/PX: CPT

## 2025-04-08 PROCEDURE — 85007 BL SMEAR W/DIFF WBC COUNT: CPT | Performed by: HOSPITALIST

## 2025-04-08 PROCEDURE — 25010000002 CEFEPIME PER 500 MG: Performed by: STUDENT IN AN ORGANIZED HEALTH CARE EDUCATION/TRAINING PROGRAM

## 2025-04-08 PROCEDURE — 85025 COMPLETE CBC W/AUTO DIFF WBC: CPT | Performed by: HOSPITALIST

## 2025-04-08 PROCEDURE — 63710000001 INSULIN LISPRO (HUMAN) PER 5 UNITS: Performed by: STUDENT IN AN ORGANIZED HEALTH CARE EDUCATION/TRAINING PROGRAM

## 2025-04-08 PROCEDURE — 94761 N-INVAS EAR/PLS OXIMETRY MLT: CPT

## 2025-04-08 PROCEDURE — 82948 REAGENT STRIP/BLOOD GLUCOSE: CPT

## 2025-04-08 PROCEDURE — 85610 PROTHROMBIN TIME: CPT | Performed by: STUDENT IN AN ORGANIZED HEALTH CARE EDUCATION/TRAINING PROGRAM

## 2025-04-08 PROCEDURE — 94664 DEMO&/EVAL PT USE INHALER: CPT

## 2025-04-08 PROCEDURE — 80048 BASIC METABOLIC PNL TOTAL CA: CPT | Performed by: HOSPITALIST

## 2025-04-08 PROCEDURE — 25010000002 METHYLPREDNISOLONE PER 40 MG: Performed by: INTERNAL MEDICINE

## 2025-04-08 RX ORDER — METHYLPREDNISOLONE SODIUM SUCCINATE 40 MG/ML
40 INJECTION, POWDER, LYOPHILIZED, FOR SOLUTION INTRAMUSCULAR; INTRAVENOUS EVERY 12 HOURS
Status: DISCONTINUED | OUTPATIENT
Start: 2025-04-08 | End: 2025-04-08 | Stop reason: HOSPADM

## 2025-04-08 RX ORDER — BUDESONIDE AND FORMOTEROL FUMARATE DIHYDRATE 160; 4.5 UG/1; UG/1
2 AEROSOL RESPIRATORY (INHALATION)
Qty: 10.2 G | Refills: 0 | Status: SHIPPED | OUTPATIENT
Start: 2025-04-08

## 2025-04-08 RX ORDER — NYSTATIN 100000 [USP'U]/G
POWDER TOPICAL EVERY 12 HOURS SCHEDULED
Start: 2025-04-08

## 2025-04-08 RX ORDER — METHYLPREDNISOLONE SODIUM SUCCINATE 40 MG/ML
40 INJECTION, POWDER, LYOPHILIZED, FOR SOLUTION INTRAMUSCULAR; INTRAVENOUS EVERY 12 HOURS
Status: DISCONTINUED | OUTPATIENT
Start: 2025-04-08 | End: 2025-04-08

## 2025-04-08 RX ORDER — PREDNISONE 20 MG/1
40 TABLET ORAL
Status: DISCONTINUED | OUTPATIENT
Start: 2025-04-09 | End: 2025-04-08 | Stop reason: HOSPADM

## 2025-04-08 RX ORDER — DOXYCYCLINE 100 MG/1
100 CAPSULE ORAL 2 TIMES DAILY
Qty: 10 CAPSULE | Refills: 0 | Status: SHIPPED | OUTPATIENT
Start: 2025-04-08 | End: 2025-04-13

## 2025-04-08 RX ORDER — PREDNISONE 20 MG/1
TABLET ORAL
Qty: 11 TABLET | Refills: 0 | Status: SHIPPED | OUTPATIENT
Start: 2025-04-09 | End: 2025-04-17

## 2025-04-08 RX ADMIN — CEFEPIME 2000 MG: 2 INJECTION, POWDER, FOR SOLUTION INTRAVENOUS at 12:30

## 2025-04-08 RX ADMIN — Medication 1000 MCG: at 08:48

## 2025-04-08 RX ADMIN — NYSTATIN: 100000 POWDER TOPICAL at 08:48

## 2025-04-08 RX ADMIN — NYSTATIN 1 APPLICATION: 100000 OINTMENT TOPICAL at 08:48

## 2025-04-08 RX ADMIN — LACTULOSE 10 G: 20 SOLUTION ORAL at 08:48

## 2025-04-08 RX ADMIN — Medication 2 SPRAY: at 08:48

## 2025-04-08 RX ADMIN — METOPROLOL TARTRATE 25 MG: 25 TABLET, FILM COATED ORAL at 08:48

## 2025-04-08 RX ADMIN — IPRATROPIUM BROMIDE AND ALBUTEROL SULFATE 3 ML: .5; 3 SOLUTION RESPIRATORY (INHALATION) at 11:46

## 2025-04-08 RX ADMIN — INSULIN LISPRO 4 UNITS: 100 INJECTION, SOLUTION INTRAVENOUS; SUBCUTANEOUS at 12:19

## 2025-04-08 RX ADMIN — IPRATROPIUM BROMIDE AND ALBUTEROL SULFATE 3 ML: .5; 3 SOLUTION RESPIRATORY (INHALATION) at 08:13

## 2025-04-08 RX ADMIN — INSULIN LISPRO 3 UNITS: 100 INJECTION, SOLUTION INTRAVENOUS; SUBCUTANEOUS at 08:48

## 2025-04-08 RX ADMIN — IPRATROPIUM BROMIDE AND ALBUTEROL SULFATE 3 ML: .5; 3 SOLUTION RESPIRATORY (INHALATION) at 02:58

## 2025-04-08 RX ADMIN — BUDESONIDE AND FORMOTEROL FUMARATE DIHYDRATE 2 PUFF: 160; 4.5 AEROSOL RESPIRATORY (INHALATION) at 08:13

## 2025-04-08 RX ADMIN — Medication 10 ML: at 08:48

## 2025-04-08 RX ADMIN — METHYLPREDNISOLONE SODIUM SUCCINATE 40 MG: 40 INJECTION, POWDER, FOR SOLUTION INTRAMUSCULAR; INTRAVENOUS at 05:50

## 2025-04-08 RX ADMIN — CEFEPIME 2000 MG: 2 INJECTION, POWDER, FOR SOLUTION INTRAVENOUS at 05:50

## 2025-04-08 NOTE — DISCHARGE PLACEMENT REQUEST
"Nikki Merino (75 y.o. Female)       Date of Birth   1950    Social Security Number       Address   [address] [facility] [address]    Home Phone   [phone]    MRN   [id number]       Religious   Non-Evangelical    Marital Status                               Admission Date   4/3/2025    Admission Type   Emergency    Admitting Provider   Tristin Galarza MD    Attending Provider   Duc Carrillo DO    Department, Room/Bed   [facility] MED SURG, 1411/1       Discharge Date       Discharge Disposition       Discharge Destination                                 Attending Provider: Duc Carrillo DO    Allergies: Ciprofloxacin, Meperidine And Related, Penicillins    Isolation: None   Infection: None   Code Status: No CPR    Ht: 162.6 cm (64\")   Wt: 94.1 kg (207 lb 7.3 oz)    Admission Cmt: None   Principal Problem: Sepsis due to pneumonia [J18.9,A41.9]                   Active Insurance as of 4/3/2025       Primary Coverage       Payor Plan Insurance Group Employer/Plan Group    MEDICARE MEDICARE A & B        Payor Plan Address Payor Plan Phone Number Payor Plan Fax Number Effective Dates    [address] [phone]  4/1/2013 - None Entered    [facility] [address]         Subscriber Name Subscriber Birth Date Member ID       NIKKI MERINO 1950 [id number]               Secondary Coverage       Payor Plan Insurance Group Employer/Plan Group    [employer] [place] [employer] [place] [facility]       Payor Plan Address Payor Plan Phone Number Payor Plan Fax Number Effective Dates    [address] [place] [address] [phone]  4/1/2013 - None Entered    [place] [address]         Subscriber Name Subscriber Birth Date Member ID       NIKKI MERINO 1950 [id number]                     Emergency Contacts        (Rel.) Home Phone Work Phone Mobile Phone    DANE VILLA (PRIMARY CONTACT) (Daughter) -- -- [phone]    WilberDwayne Young (Spouse) [phone] " [phone] --    PATRICIA CHAIREZ (Daughter) -- -- [phone]

## 2025-04-08 NOTE — PROGRESS NOTES
Enter Query Response Below      Query Response: Bacterial pneumonia unspecified      If applicable, please update the problem list.

## 2025-04-08 NOTE — DISCHARGE INSTR - APPOINTMENTS
Pt will need to call to schedule a follow up appointment with PCP- Dr. Daly. I was unable to get through on the phone to make an appointment.           [phone]    I tried to call Coumadin clinic and I left a message for them to call patient with a appointment a [facility].

## 2025-04-08 NOTE — PLAN OF CARE
Goal Outcome Evaluation:  Plan of Care Reviewed With: patient        Progress: improving  Outcome Evaluation: VSS, room air while awake, 2L O2 via nasal cannula, continues IV antibiotics and IV steroid, denies pain this shift, nystatin powder and cream applied as ordered to bilateral breast folds, abdominal folds and bottom, ambulates with walker and standby assist, call light within reach, safety measures in place, continue plan of care.

## 2025-04-08 NOTE — PLAN OF CARE
Goal Outcome Evaluation:  Plan of Care Reviewed With: patient, spouse, caregiver        Progress: improving  Outcome Evaluation: D/C teaching see D/C navigator.  Discharge instructions reviewed with pt,  Dwayne Merino, and daughter, Areli Kennedy. All verbalize understanding of discharge education. Pt to travel home with .

## 2025-04-08 NOTE — PROGRESS NOTES
[facility]  [phone]  Dr. Eliecer Gilmore    Subjective:  LOS: 4    Chief Complaint: Pneumonia    Laying in bed.  Having some dry throat.  Subjectively still feels like she is wheezing.    Objective   Vital Signs past 24hrs  Temp range: Temp (24hrs), Av.4 °F (36.3 °C), Min:96.5 °F (35.8 °C), Max:98 °F (36.7 °C)    BP range: BP: (126-161)/(66-98) 153/98  Pulse range: Heart Rate:  [] 92  Resp rate range: Resp:  [18-20] 20  Device (Oxygen Therapy): room airFlow (L/min) (Oxygen Therapy):  [2] 2  Oxygen range:SpO2:  [90 %-96 %] 93 %   Mechanical Ventilator:     Physical Exam  Constitutional:       Appearance: She is obese.   Eyes:      Pupils: Pupils are equal, round, and reactive to light.   Cardiovascular:      Rate and Rhythm: Normal rate and regular rhythm.      Heart sounds: No murmur heard.  Pulmonary:      Effort: Pulmonary effort is normal.      Breath sounds: Wheezing present.   Abdominal:      General: Bowel sounds are normal.      Palpations: Abdomen is soft. There is no mass.      Tenderness: There is no abdominal tenderness.   Musculoskeletal:         General: No swelling.   Neurological:      Mental Status: She is alert.       Results Review:    I have reviewed the laboratory and imaging data since the last note by [facility] physician.  My annotations are noted in assessment and plan.      Result Review:  I have personally reviewed the results from last note by [facility] physician to 2025 09:01 EDT and agree with these findings:  [x]  Laboratory list / accordion  [x]  Microbiology  [x]  Radiology  []  EKG/Telemetry   []  Cardiology/Vascular   []  Pathology  []  Old records  []  Other:      Medication Review:  I have reviewed the current MAR.  My annotations are noted in assessment and plan.    atorvastatin, 40 mg, Oral, Nightly  budesonide-formoterol, 2 puff, Inhalation, BID - RT  cefepime, 2,000 mg, Intravenous, Q8H  insulin lispro, 2-7 Units, Subcutaneous, 4x Daily AC & at  Bedtime  ipratropium-albuterol, 3 mL, Nebulization, Q4H - RT  lactulose, 10 g, Oral, BID  methylPREDNISolone sodium succinate, 40 mg, Intravenous, Q8H  metoprolol tartrate, 25 mg, Oral, Q12H  nystatin, 1 Application, Topical, Q12H  nystatin, , Topical, Q12H  oxymetazoline, 2 spray, Nasal, BID  sodium chloride, 10 mL, Intravenous, Q12H  vitamin B-12, 1,000 mcg, Oral, Daily  vitamin B-12, 100 mcg, Oral, Daily  warfarin, 5 mg, Oral, Once per day on Sunday Tuesday Wednesday Friday Saturday   And  [START ON 4/10/2025] warfarin, 7.5 mg, Oral, Once per day on Monday Thursday        Pharmacy to dose warfarin,   Pharmacy To Dose:,       Lines, Drains & Airways       Active LDAs       Name Placement date Placement time Site Days    Peripheral IV 04/06/25 0641 Anterior;Left Forearm 04/06/25  0641  Forearm  1                    PCCM Problems  Pneumonia  Wheezing  Bronchiectasis  Chronic nocturnal hypoxia  Obesity  Relevant Medical Diagnoses  A-fib on anticoagulation  Type 2 diabetes mellitus  Breast mass        Plan of Treatment    Continue treatment for pneumonia.  Requires follow-up chest imaging to ensure infiltrates have cleared.     Wheezing on exam is improved.  Reduce IV steroids today and likely p.o. prednisone tomorrow.    Bronchiectasis and further workup outpatient.  IgA levels are elevated but there is no evidence of vasculitis or renal insufficiency    She has appointment to see Dr. Mello Haas in the [facility] office.    Eliecer Gilmore MD  04/08/25  09:01 EDT    Isolation status: No active isolations    Dietary Orders (From admission, onward)       Start     Ordered    04/04/25 0133  Diet: Diabetic; Consistent Carbohydrate; Fluid Consistency: Thin (IDDSI 0)  Diet Effective Now        References:    Diet Order Definitions   Question Answer Comment   Diets: Diabetic    Diabetic Diet: Consistent Carbohydrate    Fluid Consistency: Thin (IDDSI 0)        04/04/25 0133                    Part of this note  may be an electronic transcription/translation of spoken language to printed text using the Dragon Dictation System.

## 2025-04-08 NOTE — DISCHARGE SUMMARY
Nikki Merino  1950  [id number]    Hospitalists Discharge Summary    Date of Admission: 4/3/2025  Date of Discharge:  4/8/2025    Primary Discharge Diagnoses: ***    Secondary Discharge Diagnoses: ***      History of Present Illness:***    Hospital Course:      PCP  Patient Care Team:  Francoise Daly MD as PCP - General (Family Medicine)    Consults:   Consults       Date and Time Order Name Status Description    4/4/2025  1:33 AM Inpatient Pulmonology Consult Completed             Operations and Procedures Performed:       US Liver  Result Date: 4/4/2025  Narrative: US LIVER Date of Exam: 4/4/2025 8:09 AM EDT Indication: Elevated bilirubin, elevated ammonia 109 from outside labs.  Repeat ammonia pending.. Comparison: No comparisons available. Technique: Grayscale and color Doppler ultrasound evaluation of the right upper quadrant was performed. Findings: LIVER:  The liver appears normal in echogenicity.No focal lesions identified. Normal flow is present within the hepatic vasculature. GALLBLADDER: The gallbladder contains a calcified stone with posterior shadowing. There is gallbladder wall thickening measuring 5 mm. No evidence for pericholecystic fluid or evidence for a positive Escobar's sign. The common bile duct measures 2 mm. PANCREAS:  Visualized portions are unremarkable. RIGHT KIDNEY:  Normal in size with no focal lesions. No evidence of nephrolithiasis or hydronephrosis.     Impression: Impression: Cholelithiasis with nonspecific gallbladder wall thickening without additional findings to suggest acute cholecystitis. Electronically Signed: Aleta Gonzalez MD  4/4/2025 8:38 AM EDT  Workstation ID: [id number]    CT Angiogram Chest  Result Date: 4/4/2025  Narrative: CT ANGIOGRAM CHEST Date of Exam: 4/4/2025 12:40 AM EDT Indication: likely pneumonia Lll, r/o PE. Comparison: None available. Technique: CTA of the chest was performed before and after the uneventful intravenous administration of iodinated  contrast. Reconstructed coronal and sagittal images were also obtained. In addition, a 3-D volume rendered image was created for interpretation. Automated exposure control and iterative reconstruction methods were used. Findings: Pulmonary arteries: Adequate opacification of the pulmonary arteries. No evidence of acute pulmonary embolism. Lungs and Pleura: There is mild left lower lobe airspace disease. There is right middle lobe and right lower lobe airspace disease with air bronchograms and nodular opacities consistent with multifocal pneumonia. Mediastinum/Shonda: No mediastinal or hilar lymphadenopathy. Lymph nodes: No axillary or supraclavicular adenopathy. Cardiovascular: The cardiac chambers are within normal limits. The pericardium is normal. The aorta and its arch branch vessels are unremarkable.   Upper Abdomen: There is a nonobstructing stone of the left kidney upper pole. Bones and Soft Tissue: No suspicious osseous lesion. There is a rounded mass in the right breast measuring 7.1 x 5.6 x 6.5 cm. The patient does not have a history of mammography at this institution. Relation with any prior mammographic evaluation would be recommended. If there is not been recent mammogram or ultrasound, further diagnostic right breast evaluation would be recommended to ensure there is no underlying mass lesion.     Impression: Impression: 1.No evidence of pulmonary embolism. 2.Multifocal pneumonia. 3.7.1 x 5.6 x 6.5 cm right breast mass. Correlation with any prior mammographic evaluation would be recommended. If there is not been recent mammogram or ultrasound at an outside facility, further diagnostic right breast evaluation would be recommended to ensure there is no underlying mass lesion. Electronically Signed: Alvaro Owens MD  4/4/2025 1:05 AM EDT  Workstation ID: [id number]    XR Chest 1 View  Result Date: 4/3/2025  Narrative: XR CHEST 1 VW Date of Exam: 4/3/2025 10:47 PM EDT Indication: Chest Pain Protocol Chest Pain  Protocol Comparison: None available. Findings: The heart is enlarged. The pulmonary vascular markings are normal. There are small bilateral pleural effusions. There is bilateral basilar atelectasis. There is no pneumothorax. The osseous structures are normal.     Impression: Impression: Cardiomegaly. Small bilateral pleural effusions. Bibasilar atelectasis. Electronically Signed: Alvaro Owens MD  4/3/2025 11:06 PM EDT  Workstation ID: [id number]      Allergies:  is allergic to ciprofloxacin, meperidine and related, and penicillins.    Keith  {Desc; reviewed/not reviewed:[id number]}    Discharge Medications:     Discharge Medications        New Medications        Instructions Start Date   budesonide-formoterol 160-4.5 MCG/ACT inhaler  Commonly known as: SYMBICORT   2 puffs, Inhalation, 2 Times Daily - RT      doxycycline 100 MG capsule  Commonly known as: VIBRAMYCIN   100 mg, Oral, 2 Times Daily      nystatin 490311 UNIT/GM powder  Commonly known as: MYCOSTATIN   Topical, Every 12 Hours Scheduled      predniSONE 20 MG tablet  Commonly known as: DELTASONE   Take 2 tablets by mouth Daily With Breakfast for 3 days, THEN 1 tablet Daily With Breakfast for 5 days.   Start Date: April 9, 2025            Continue These Medications        Instructions Start Date   acetaminophen 500 MG tablet  Commonly known as: TYLENOL   500 mg, Every 6 Hours PRN      albuterol (2.5 MG/3ML) 0.083% nebulizer solution  Commonly known as: PROVENTIL   2.5 mg, Every 4 Hours PRN      albuterol sulfate  (90 Base) MCG/ACT inhaler  Commonly known as: PROVENTIL HFA;VENTOLIN HFA;PROAIR HFA   2 puffs, Inhalation, Every 4 Hours PRN      atorvastatin 40 MG tablet  Commonly known as: LIPITOR   40 mg, Nightly      fluticasone 50 MCG/ACT nasal spray  Commonly known as: FLONASE   2 sprays, Nasal, Daily      lactulose 10 GM/15ML solution  Commonly known as: CHRONULAC   10 g, 3 Times Daily      metoprolol succinate XL 25 MG 24 hr tablet  Commonly known as:  TOPROL-XL   25 mg, Daily      OptiChamber Advantage misc   Not Applicable      spironolactone 100 MG tablet  Commonly known as: ALDACTONE   100 mg, Daily      vitamin B-12 100 MCG tablet  Commonly known as: CYANOCOBALAMIN   100 mcg, Oral, Daily      warfarin 7.5 MG tablet  Commonly known as: COUMADIN   7.5 mg, Daily Warfarin             Stop These Medications      LORazepam 0.5 MG tablet  Commonly known as: ATIVAN              Last Lab Results:   Lab Results (most recent)       Procedure Component Value Units Date/Time    POC Glucose Once [[id number]]  (Abnormal) Collected: 04/08/25 1205    Specimen: Blood Updated: 04/08/25 1211     Glucose 251 mg/dL     POC Glucose Once [[id number]]  (Abnormal) Collected: 04/08/25 0822    Specimen: Blood Updated: 04/08/25 0827     Glucose 225 mg/dL     CBC & Differential [[id number]]  (Abnormal) Collected: 04/08/25 0336    Specimen: Blood Updated: 04/08/25 0602    Narrative:      The following orders were created for panel order CBC & Differential.  Procedure                               Abnormality         Status                     ---------                               -----------         ------                     CBC Auto Differential[[id number]]        Abnormal            Final result               Scan Slide[[id number]]                                       Final result                 Please view results for these tests on the individual orders.    Scan Slide [[id number]] Collected: 04/08/25 0336    Specimen: Blood Updated: 04/08/25 0602     RBC Morphology Normal     WBC Morphology Normal     Platelet Estimate Adequate    Protime-INR [[id number]]  (Abnormal) Collected: 04/08/25 0336    Specimen: Blood Updated: 04/08/25 0514     Protime 20.2 Seconds      INR 1.67    Narrative:      Therapeutic Ranges for INR: 2.0-3.0 (PT 20-30)                              2.5-3.5 (PT 25-34)    Basic Metabolic Panel [[id number]]  (Abnormal) Collected: 04/08/25 0336    Specimen: Blood Updated:  04/08/25 0505     Glucose 266 mg/dL      BUN 16 mg/dL      Creatinine 0.69 mg/dL      Sodium 139 mmol/L      Potassium 4.2 mmol/L      Comment: Specimen hemolyzed.  Result may be falsely elevated.        Chloride 103 mmol/L      CO2 22.3 mmol/L      Calcium 9.6 mg/dL      BUN/Creatinine Ratio 23.2     Anion Gap 13.7 mmol/L      eGFR 89.5 mL/min/1.73     Narrative:      GFR Categories in Chronic Kidney Disease (CKD)      GFR Category          GFR (mL/min/1.73)    Interpretation  G1                     90 or greater         Normal or high (1)  G2                      60-89                Mild decrease (1)  G3a                   45-59                Mild to moderate decrease  G3b                   30-44                Moderate to severe decrease  G4                    15-29                Severe decrease  G5                    14 or less           Kidney failure          (1)In the absence of evidence of kidney disease, neither GFR category G1 or G2 fulfill the criteria for CKD.    eGFR calculation 2021 CKD-EPI creatinine equation, which does not include race as a factor    CBC Auto Differential [[id number]]  (Abnormal) Collected: 04/08/25 0336    Specimen: Blood Updated: 04/08/25 0451     WBC 9.22 10*3/mm3      RBC 5.42 10*6/mm3      Hemoglobin 16.7 g/dL      Hematocrit 51.0 %      MCV 94.1 fL      MCH 30.8 pg      MCHC 32.7 g/dL      RDW 13.5 %      RDW-SD 46.4 fl      MPV 10.7 fL      Platelets 223 10*3/mm3      Neutrophil % 83.9 %      Lymphocyte % 14.4 %      Monocyte % 0.8 %      Eosinophil % 0.0 %      Basophil % 0.2 %      Immature Grans % 0.7 %      Neutrophils, Absolute 7.74 10*3/mm3      Lymphocytes, Absolute 1.33 10*3/mm3      Monocytes, Absolute 0.07 10*3/mm3      Eosinophils, Absolute 0.00 10*3/mm3      Basophils, Absolute 0.02 10*3/mm3      Immature Grans, Absolute 0.06 10*3/mm3      nRBC 0.0 /100 WBC     Blood Culture - Blood, Arm, Right [[id number]]  (Normal) Collected: 04/03/25 2311    Specimen: Blood  from Arm, Right Updated: 04/07/25 2330     Blood Culture No growth at 4 days    Blood Culture - Blood, Arm, Left [[id number]]  (Normal) Collected: 04/03/25 2311    Specimen: Blood from Arm, Left Updated: 04/07/25 2330     Blood Culture No growth at 4 days    Creatinine Serum (kidney function) GFR component [[id number]]  (Normal) Collected: 04/07/25 0340    Specimen: Blood Updated: 04/07/25 0445     Creatinine 0.60 mg/dL      eGFR 93.2 mL/min/1.73     Narrative:      GFR Categories in Chronic Kidney Disease (CKD)      GFR Category          GFR (mL/min/1.73)    Interpretation  G1                     90 or greater         Normal or high (1)  G2                      60-89                Mild decrease (1)  G3a                   45-59                Mild to moderate decrease  G3b                   30-44                Moderate to severe decrease  G4                    15-29                Severe decrease  G5                    14 or less           Kidney failure          (1)In the absence of evidence of kidney disease, neither GFR category G1 or G2 fulfill the criteria for CKD.    eGFR calculation 2021 CKD-EPI creatinine equation, which does not include race as a factor    Protime-INR [[id number]]  (Abnormal) Collected: 04/07/25 0340    Specimen: Blood Updated: 04/07/25 0443     Protime 24.1 Seconds      INR 2.10    Narrative:      Therapeutic Ranges for INR: 2.0-3.0 (PT 20-30)                              2.5-3.5 (PT 25-34)    IgG, IgA, IgM [[id number]]  (Abnormal) Collected: 04/05/25 0559    Specimen: Blood Updated: 04/06/25 1238     IgG 1,098 mg/dL      IgM 38 mg/dL      IgA 526 mg/dL     Comprehensive Metabolic Panel [[id number]]  (Abnormal) Collected: 04/06/25 0639    Specimen: Blood Updated: 04/06/25 0710     Glucose 130 mg/dL      BUN 14 mg/dL      Creatinine 0.74 mg/dL      Sodium 139 mmol/L      Potassium 4.2 mmol/L      Chloride 104 mmol/L      CO2 25.1 mmol/L      Calcium 9.9 mg/dL      Total Protein 6.8 g/dL       Albumin 3.1 g/dL      ALT (SGPT) 25 U/L      AST (SGOT) 25 U/L      Alkaline Phosphatase 98 U/L      Total Bilirubin 0.9 mg/dL      Globulin 3.7 gm/dL      A/G Ratio 0.8 g/dL      BUN/Creatinine Ratio 18.9     Anion Gap 9.9 mmol/L      eGFR 84.0 mL/min/1.73     Narrative:      GFR Categories in Chronic Kidney Disease (CKD)      GFR Category          GFR (mL/min/1.73)    Interpretation  G1                     90 or greater         Normal or high (1)  G2                      60-89                Mild decrease (1)  G3a                   45-59                Mild to moderate decrease  G3b                   30-44                Moderate to severe decrease  G4                    15-29                Severe decrease  G5                    14 or less           Kidney failure          (1)In the absence of evidence of kidney disease, neither GFR category G1 or G2 fulfill the criteria for CKD.    eGFR calculation 2021 CKD-EPI creatinine equation, which does not include race as a factor    CBC & Differential [[id number]]  (Abnormal) Collected: 04/06/25 0639    Specimen: Blood Updated: 04/06/25 0650    Narrative:      The following orders were created for panel order CBC & Differential.  Procedure                               Abnormality         Status                     ---------                               -----------         ------                     CBC Auto Differential[[id number]]        Abnormal            Final result                 Please view results for these tests on the individual orders.    CBC Auto Differential [[id number]]  (Abnormal) Collected: 04/06/25 0639    Specimen: Blood Updated: 04/06/25 0650     WBC 12.41 10*3/mm3      RBC 5.29 10*6/mm3      Hemoglobin 16.4 g/dL      Hematocrit 50.9 %      MCV 96.2 fL      MCH 31.0 pg      MCHC 32.2 g/dL      RDW 14.0 %      RDW-SD 49.7 fl      MPV 8.9 fL      Platelets 341 10*3/mm3      Neutrophil % 68.6 %      Lymphocyte % 20.2 %      Monocyte % 8.0 %       Eosinophil % 2.0 %      Basophil % 0.6 %      Immature Grans % 0.6 %      Neutrophils, Absolute 8.51 10*3/mm3      Lymphocytes, Absolute 2.51 10*3/mm3      Monocytes, Absolute 0.99 10*3/mm3      Eosinophils, Absolute 0.25 10*3/mm3      Basophils, Absolute 0.07 10*3/mm3      Immature Grans, Absolute 0.08 10*3/mm3      nRBC 0.0 /100 WBC     IgE [[id number]] Collected: 04/06/25 0639    Specimen: Blood Updated: 04/06/25 0642    Comprehensive Metabolic Panel [[id number]]  (Abnormal) Collected: 04/05/25 0559    Specimen: Blood Updated: 04/05/25 0655     Glucose 128 mg/dL      BUN 14 mg/dL      Creatinine 0.69 mg/dL      Sodium 140 mmol/L      Potassium 4.2 mmol/L      Chloride 105 mmol/L      CO2 26.2 mmol/L      Calcium 9.8 mg/dL      Total Protein 6.5 g/dL      Albumin 3.1 g/dL      ALT (SGPT) 22 U/L      AST (SGOT) 22 U/L      Alkaline Phosphatase 85 U/L      Total Bilirubin 0.9 mg/dL      Globulin 3.4 gm/dL      A/G Ratio 0.9 g/dL      BUN/Creatinine Ratio 20.3     Anion Gap 8.8 mmol/L      eGFR 90.1 mL/min/1.73     Narrative:      GFR Categories in Chronic Kidney Disease (CKD)      GFR Category          GFR (mL/min/1.73)    Interpretation  G1                     90 or greater         Normal or high (1)  G2                      60-89                Mild decrease (1)  G3a                   45-59                Mild to moderate decrease  G3b                   30-44                Moderate to severe decrease  G4                    15-29                Severe decrease  G5                    14 or less           Kidney failure          (1)In the absence of evidence of kidney disease, neither GFR category G1 or G2 fulfill the criteria for CKD.    eGFR calculation 2021 CKD-EPI creatinine equation, which does not include race as a factor    Magnesium [[id number]]  (Normal) Collected: 04/05/25 0559    Specimen: Blood Updated: 04/05/25 0655     Magnesium 1.6 mg/dL     Vancomycin, Peak [[id number]]  (Abnormal) Collected: 04/05/25  0559    Specimen: Blood Updated: 04/05/25 0653     Vancomycin Peak 14.00 mcg/mL     Narrative:      Therapeutic Ranges for Vancomycin    Vancomycin Random   5.0-40.0 mcg/mL  Vancomycin Trough   5.0-20.0 mcg/mL  Vancomycin Peak     20.0-40.0 mcg/mL    MRSA Screen, PCR (Inpatient) - Swab, Nares [[id number]]  (Normal) Collected: 04/04/25 0240    Specimen: Swab from Nares Updated: 04/04/25 1125     MRSA PCR No MRSA Detected    Narrative:      The negative predictive value of this diagnostic test is high and should only be used to consider de-escalating anti-MRSA therapy. A positive result may indicate colonization with MRSA and must be correlated clinically.    Vancomycin, Random [[id number]]  (Normal) Collected: 04/04/25 0825    Specimen: Blood Updated: 04/04/25 0850     Vancomycin Random 11.60 mcg/mL     Narrative:      Therapeutic Ranges for Vancomycin    Vancomycin Random   5.0-40.0 mcg/mL  Vancomycin Trough   5.0-20.0 mcg/mL  Vancomycin Peak     20.0-40.0 mcg/mL    Ammonia [[id number]]  (Abnormal) Collected: 04/04/25 0409    Specimen: Blood Updated: 04/04/25 0446     Ammonia 53 umol/L      Comment: Specimen hemolyzed.  Results may be affected.       CBC (No Diff) [[id number]]  (Abnormal) Collected: 04/04/25 0409    Specimen: Blood Updated: 04/04/25 0437     WBC 22.97 10*3/mm3      RBC 5.26 10*6/mm3      Hemoglobin 16.4 g/dL      Hematocrit 49.9 %      MCV 94.9 fL      MCH 31.2 pg      MCHC 32.9 g/dL      RDW 14.2 %      RDW-SD 49.7 fl      MPV 9.9 fL      Platelets 345 10*3/mm3     High Sensitivity Troponin T 1Hr [[id number]]  (Abnormal) Collected: 04/04/25 0055    Specimen: Blood Updated: 04/04/25 0116     HS Troponin T 25 ng/L      Troponin T Numeric Delta 3 ng/L      Troponin T % Delta 14    Narrative:      High Sensitive Troponin T Reference Range:  <14.0 ng/L- Negative Female for AMI  <22.0 ng/L- Negative Male for AMI  >=14 - Abnormal Female indicating possible myocardial injury.  >=22 - Abnormal Male indicating  possible myocardial injury.   Clinicians would have to utilize clinical acumen, EKG, Troponin, and serial changes to determine if it is an Acute Myocardial Infarction or myocardial injury due to an underlying chronic condition.         Urinalysis, Microscopic Only - Urine, Catheter [[id number]]  (Abnormal) Collected: 04/03/25 2312    Specimen: Urine, Catheter Updated: 04/04/25 0006     RBC, UA 11-20 /HPF      WBC, UA 3-5 /HPF      Bacteria, UA Trace /HPF      Squamous Epithelial Cells, UA 7-12 /HPF      Transitional Epithelial Cells, UA 0-2 /HPF      Hyaline Casts, UA 0-2 /LPF      RBC Casts 0-2 /LPF      Methodology Manual Light Microscopy    Blood Gas, Arterial - [[id number]]  (Abnormal) Collected: 04/03/25 2352    Specimen: Arterial Blood Updated: 04/03/25 2358     Site Right Brachial     Ld's Test N/A     pH, Arterial 7.319 pH units      Comment: 84 Value below reference range        pCO2, Arterial 50.8 mm Hg      Comment: 83 Value above reference range        pO2, Arterial 146.0 mm Hg      Comment: 83 Value above reference range        HCO3, Arterial 26.1 mmol/L      Comment: 83 Value above reference range        Base Excess, Arterial -1.0 mmol/L      Comment: 84 Value below reference range        O2 Saturation, Arterial 98.1 %      Hemoglobin, Blood Gas 17.0 g/dL      Temperature 37.0     Barometric Pressure for Blood Gas 744 mmHg      Modality Nasal Cannula     Flow Rate 6.0 lpm      Ventilator Mode --     Collected by [id number]     Comment: Meter: [id number]     :  [id number]        pCO2, Temperature Corrected 50.8 mm Hg      pH, Temp Corrected 7.319 pH Units      pO2, Temperature Corrected 146 mm Hg     aPTT [[id number]]  (Abnormal) Collected: 04/03/25 2311    Specimen: Blood Updated: 04/03/25 2357     PTT 40.0 seconds     Narrative:      PTT = The equivalent PTT values for the therapeutic range of heparin levels at 0.1 to 0.7 U/ml are 53 to 110 seconds.      Urinalysis With Microscopic If  "Indicated (No Culture) - Urine, Catheter [[id number]]  (Abnormal) Collected: 04/03/25 2312    Specimen: Urine, Catheter Updated: 04/03/25 2355     Color, UA Dark Yellow     Appearance, UA Clear     pH, UA 7.0     Specific [place], UA 1.026     Comment: Result obtained by Refractometer        Glucose, UA Negative     Ketones, UA Negative     Bilirubin, UA Negative     Blood, UA Moderate (2+)     Protein, UA >=300 mg/dL (3+)     Leuk Esterase, UA Trace     Nitrite, UA Negative     Urobilinogen, UA 2.0 E.U./dL    Procalcitonin [[id number]]  (Normal) Collected: 04/03/25 2311    Specimen: Blood Updated: 04/03/25 2348     Procalcitonin 0.25 ng/mL     Narrative:      As a Marker for Sepsis (Non-Neonates):    1. <0.5 ng/mL represents a low risk of severe sepsis and/or septic shock.  2. >2 ng/mL represents a high risk of severe sepsis and/or septic shock.    As a Marker for Lower Respiratory Tract Infections that require antibiotic therapy:    PCT on Admission    Antibiotic Therapy       6-12 Hrs later    >0.5                Strongly Recommended  >0.25 - <0.5        Recommended   0.1 - 0.25          Discouraged              Remeasure/reassess PCT  <0.1                Strongly Discouraged     Remeasure/reassess PCT    As 28 day mortality risk marker: \"Change in Procalcitonin Result\" (>80% or <=80%) if Day 0 (or Day 1) and Day 4 values are available. Refer to http://www.[facility]-pct-[email or web address]    Change in PCT <=80%  A decrease of PCT levels below or equal to 80% defines a positive change in PCT test result representing a higher risk for 28-day all-cause mortality of patients diagnosed with severe sepsis for septic shock.    Change in PCT >80%  A decrease of PCT levels of more than 80% defines a negative change in PCT result representing a lower risk for 28-day all-cause mortality of patients diagnosed with severe sepsis or septic shock.       High Sensitivity Troponin T [[id number]]  (Abnormal) Collected: 04/03/25 2311    " Specimen: Blood Updated: 04/03/25 2344     HS Troponin T 22 ng/L     Narrative:      High Sensitive Troponin T Reference Range:  <14.0 ng/L- Negative Female for AMI  <22.0 ng/L- Negative Male for AMI  >=14 - Abnormal Female indicating possible myocardial injury.  >=22 - Abnormal Male indicating possible myocardial injury.   Clinicians would have to utilize clinical acumen, EKG, Troponin, and serial changes to determine if it is an Acute Myocardial Infarction or myocardial injury due to an underlying chronic condition.         BNP [[id number]]  (Normal) Collected: 04/03/25 2311    Specimen: Blood Updated: 04/03/25 2344     proBNP 428.0 pg/mL     Narrative:      This assay is used as an aid in the diagnosis of individuals suspected of having heart failure. It can be used as an aid in the diagnosis of acute decompensated heart failure (ADHF) in patients presenting with signs and symptoms of ADHF to the emergency department (ED). In addition, NT-proBNP of <300 pg/mL indicates ADHF is not likely.    Age Range Result Interpretation  NT-proBNP Concentration (pg/mL:      <50             Positive            >450                   Gray                 300-450                    Negative             <300    50-75           Positive            >900                  Gray                300-900                  Negative            <300      >75             Positive            >1800                  Gray                300-1800                  Negative            <300    Lactic Acid, Plasma [[id number]]  (Normal) Collected: 04/03/25 2311    Specimen: Blood Updated: 04/03/25 2337     Lactate 1.7 mmol/L     [place] Draw [[id number]] Collected: 04/03/25 2311    Specimen: Blood Updated: 04/03/25 2330    Narrative:      The following orders were created for panel order [place] Draw.  Procedure                               Abnormality         Status                     ---------                               -----------         ------                      Green Top (Gel)[[id number]]                                  Final result               Lavender Top[[id number]]                                     Final result               Gold Top - SST[[id number]]                                   Final result               Light Blue Top[[id number]]                                   Final result                 Please view results for these tests on the individual orders.    Green Top (Gel) [[id number]] Collected: 04/03/25 2311    Specimen: Blood Updated: 04/03/25 2330     Extra Tube Hold for add-ons.     Comment: Auto resulted.       Lavender Top [[id number]] Collected: 04/03/25 2311    Specimen: Blood Updated: 04/03/25 2330     Extra Tube hold for add-on     Comment: Auto resulted       Gold Top - SST [[id number]] Collected: 04/03/25 2311    Specimen: Blood Updated: 04/03/25 2330     Extra Tube Hold for add-ons.     Comment: Auto resulted.       Light Blue Top [[id number]] Collected: 04/03/25 2311    Specimen: Blood Updated: 04/03/25 2330     Extra Tube Hold for add-ons.     Comment: Auto resulted             Imaging Results (Most Recent)       Procedure Component Value Units Date/Time    US Liver [[id number]] Collected: 04/04/25 0836     Updated: 04/04/25 0841    Narrative:      US LIVER    Date of Exam: 4/4/2025 8:09 AM EDT    Indication: Elevated bilirubin, elevated ammonia 109 from outside labs.  Repeat ammonia pending..    Comparison: No comparisons available.    Technique: Grayscale and color Doppler ultrasound evaluation of the right upper quadrant was performed.      Findings:  LIVER:  The liver appears normal in echogenicity.No focal lesions identified. Normal flow is present within the hepatic vasculature.     GALLBLADDER: The gallbladder contains a calcified stone with posterior shadowing. There is gallbladder wall thickening measuring 5 mm. No evidence for pericholecystic fluid or evidence for a positive Escobar's sign. The common bile duct measures 2  mm.    PANCREAS:  Visualized portions are unremarkable.    RIGHT KIDNEY:  Normal in size with no focal lesions. No evidence of nephrolithiasis or hydronephrosis.          Impression:      Impression:  Cholelithiasis with nonspecific gallbladder wall thickening without additional findings to suggest acute cholecystitis.        Electronically Signed: Aleta Gonzalez MD    4/4/2025 8:38 AM EDT    Workstation ID: [id number]    CT Angiogram Chest [[id number]] Collected: 04/04/25 0059     Updated: 04/04/25 0108    Narrative:      CT ANGIOGRAM CHEST    Date of Exam: 4/4/2025 12:40 AM EDT    Indication: likely pneumonia Lll, r/o PE.    Comparison: None available.    Technique: CTA of the chest was performed before and after the uneventful intravenous administration of iodinated contrast. Reconstructed coronal and sagittal images were also obtained. In addition, a 3-D volume rendered image was created for   interpretation. Automated exposure control and iterative reconstruction methods were used.    Findings:  Pulmonary arteries: Adequate opacification of the pulmonary arteries. No evidence of acute pulmonary embolism.    Lungs and Pleura: There is mild left lower lobe airspace disease. There is right middle lobe and right lower lobe airspace disease with air bronchograms and nodular opacities consistent with multifocal pneumonia.    Mediastinum/Shonda: No mediastinal or hilar lymphadenopathy.    Lymph nodes: No axillary or supraclavicular adenopathy.    Cardiovascular: The cardiac chambers are within normal limits. The pericardium is normal. The aorta and its arch branch vessels are unremarkable.       Upper Abdomen: There is a nonobstructing stone of the left kidney upper pole.    Bones and Soft Tissue: No suspicious osseous lesion. There is a rounded mass in the right breast measuring 7.1 x 5.6 x 6.5 cm. The patient does not have a history of mammography at this institution. Relation with any prior mammographic evaluation  would   be recommended. If there is not been recent mammogram or ultrasound, further diagnostic right breast evaluation would be recommended to ensure there is no underlying mass lesion.        Impression:      Impression:  1.No evidence of pulmonary embolism.  2.Multifocal pneumonia.  3.7.1 x 5.6 x 6.5 cm right breast mass. Correlation with any prior mammographic evaluation would be recommended. If there is not been recent mammogram or ultrasound at an outside facility, further diagnostic right breast evaluation would be recommended   to ensure there is no underlying mass lesion.            Electronically Signed: Alvaro Owens MD    4/4/2025 1:05 AM EDT    Workstation ID: [id number]    XR Chest 1 View [[id number]] Collected: 04/03/25 2305     Updated: 04/03/25 2310    Narrative:      XR CHEST 1 VW    Date of Exam: 4/3/2025 10:47 PM EDT    Indication: Chest Pain Protocol  Chest Pain Protocol    Comparison: None available.    Findings:  The heart is enlarged. The pulmonary vascular markings are normal. There are small bilateral pleural effusions. There is bilateral basilar atelectasis. There is no pneumothorax. The osseous structures are normal.      Impression:      Impression:  Cardiomegaly. Small bilateral pleural effusions. Bibasilar atelectasis.          Electronically Signed: Alvaro Owens MD    4/3/2025 11:06 PM EDT    Workstation ID: [id number]            PROCEDURES      Condition on Discharge:  ***    Physical Exam at Discharge  Vital Signs  Temp:  [96.5 °F (35.8 °C)-97.9 °F (36.6 °C)] 97 °F (36.1 °C)  Heart Rate:  [] 84  Resp:  [18-20] 20  BP: (126-161)/(66-98) 152/88    Physical Exam:  Physical Exam   Constitutional: Patient appears well-developed and well-nourished and in no acute distress   HEENT:   Head: Normocephalic and atraumatic.   Eyes:  Pupils are equal, round, and reactive to light. EOM are intact. Sclera are anicteric and non-injected.  Mouth and Throat: Patient has moist mucous membranes.  Oropharynx is clear of any erythema or exudate.     Neck: Neck supple. No JVD present. No thyromegaly present. No lymphadenopathy present.  Cardiovascular: Regular rate, regular rhythm, S1 normal and S2 normal.  Exam reveals no gallop and no friction rub.  No murmur heard.  Pulmonary/Chest: Lungs are clear to auscultation bilaterally. No respiratory distress. No wheezes. No rhonchi. No rales.   Abdominal: Soft. Bowel sounds are normal. No distension and no mass. There is no hepatosplenomegaly. There is no tenderness.   Musculoskeletal: Normal Muscle tone  Extremities: No edema. Pulses are palpable in all 4 extremities.  Neurological: Patient is alert and oriented to person, place, and time. Cranial nerves II-XII are grossly intact with no focal deficits.  Skin: Skin is warm. No rash noted. Nails show no clubbing.  No cyanosis or erythema.    Discharge Disposition  ***    Visiting Nurse:    {YES/NO:200010}    Home PT/OT:  {YES/NO:200010}    Home Safety Evaluation:  {YES/NO:200010}    DME  ***    Discharge Diet:      Dietary Orders (From admission, onward)       Start     Ordered    04/04/25 0133  Diet: Diabetic; Consistent Carbohydrate; Fluid Consistency: Thin (IDDSI 0)  Diet Effective Now        References:    Diet Order Definitions   Question Answer Comment   Diets: Diabetic    Diabetic Diet: Consistent Carbohydrate    Fluid Consistency: Thin (IDDSI 0)        04/04/25 0133                    Activity at Discharge:  ***    Pre-discharge education  {Discharge Education:64255}      Follow-up Appointments  No future appointments.  Additional Instructions for the Follow-ups that You Need to Schedule       Discharge Follow-up with PCP   As directed       Currently Documented PCP:    Francoise Daly MD    PCP Phone Number:    [phone]     Follow Up Details: 1 week        Discharge Follow-up with Specified Provider: [facility]; 2 Days   As directed      To: [facility]   Follow Up: 2 Days        Discharge  Follow-up with Specified Provider: Mello Haas M.D. as scheduled   As directed      To: Mello Haas M.D. as scheduled                Test Results Pending at Discharge  Pending Labs       Order Current Status    IgE In process    Blood Culture - Blood, Arm, Left Preliminary result    Blood Culture - Blood, Arm, Right Preliminary result             Gonzalez Messina MD  04/08/25  13:03 EDT    Time: {Time spent:552887931} (if over 30 minutes give explanation as to why it took greater than 30 minutes)

## 2025-04-08 NOTE — CASE MANAGEMENT/SOCIAL WORK
Continued Stay Note  JUSTIN Pantoja     Patient Name: Nikki Merino  MRN: [id number]  Today's Date: 4/8/2025    Admit Date: 4/3/2025    Plan: Home with  and accepting HH   Discharge Plan       Row Name 04/08/25 1346       Plan                                         Home with  and [facility]    Plan Comments CM received a call from Sintia with [facility] and she states they can accept for in home PT. POLLY followed up with patient and she states [facility] is her preference for HH and is agreeable for this service. She had no other questions. CM will follow.      Row Name 04/08/25 1237       Plan    Plan Home with  and accepting HH    Plan Comments Per MD he PT is recommending PT at home and he will place HH order. CM reviewed notes and patient has previously used [facility] and referral was entered into [facility]. POLLY spoke with Sintia with [facility] and she states she will check with the office to see if they have staffing to accept and let me know. CM will follow.                   Discharge Codes    No documentation.                 Expected Discharge Date and Time       Expected Discharge Date Expected Discharge Time    Apr 8, 2025               Fouzia Escobar, RN

## 2025-04-08 NOTE — PROGRESS NOTES
3/4/2025    Greg Matthews Jr.  YOB: 2019        To whom it may concern:    Greg's Adderall has been discontinued by me as of 3/4/2025.  He no longer requires medication administration at school.      Sincerely,          Dieudonne Snider MSN, APRN  Certified Pediatric Nurse Practitioner  [facility]  [address] [address]  P: [phone]  F: [phone]     "Pharmacy dosing service  Anticoagulant  Warfarin     Subjective:    Nikki Merino is a 75 y.o.female being continued on warfarin for Atrial Fibrillation / Flutter.    INR Goal: 2 - 3  Home medication?: warfarin 7.5 mg PO Sun, Tues, Thur, Fri, warfarin 5 mg PO on Mon, Wed, Fri.   Bridge Therapy Present?:  No  Interacting Medications Evaluation (New/Present/Discontinued): Cefepime (may increase INR)  Additional Contributing Factors: Acute illness      Assessment/Plan:    Patient was cautiously restarted on warfarin on 4/6 and only 3 mg was given and 6 mg was given yesterday. Will resume scheduled warfarin dose today with ~10% dose reduction from home dose since supratherapeutic on admission. Will give warfarin 5 mg Sunday, Tuesday, Wednesday, Friday, Saturday and 7.5 mg on Monday and Thursday.    Continue to monitor and adjust based on INR.         Date 4/4 4/5 4/6 4/7 4/8       INR 3.54 4.16 2.61 2.1 1.67       Dose HOLD HOLD 3 mg 6 mg 5 mg           Objective:  [Ht: 162.6 cm (64\"); Wt: 94.1 kg (207 lb 7.3 oz); BMI: Body mass index is 35.61 kg/m².]    Lab Results   Component Value Date    ALBUMIN 3.1 (L) 04/06/2025     Lab Results   Component Value Date    INR 1.67 (H) 04/08/2025    INR 2.10 (H) 04/07/2025    INR 2.61 (H) 04/06/2025    PROTIME 20.2 (H) 04/08/2025    PROTIME 24.1 (H) 04/07/2025    PROTIME 28.6 (H) 04/06/2025     Lab Results   Component Value Date    HGB 16.7 (H) 04/08/2025    HGB 16.4 (H) 04/06/2025    HGB 15.6 04/05/2025     Lab Results   Component Value Date    HCT 51.0 (H) 04/08/2025    HCT 50.9 (H) 04/06/2025    HCT 48.0 (H) 04/05/2025       Mahi Chi, PharmNIRAJ  04/08/25 11:26 EDT   "

## 2025-04-08 NOTE — CASE MANAGEMENT/SOCIAL WORK
Continued Stay Note  JUSTIN Pantjoa     Patient Name: Nikki Merino  MRN: [id number]  Today's Date: 4/8/2025    Admit Date: 4/3/2025    Plan: Home with  and accepting HH   Discharge Plan       Row Name 04/08/25 1237       Plan    Plan Home with  and accepting HH    Plan Comments Per MD he PT is recommending PT at home and he will place HH order. CM reviewed notes and patient has previously used [facility] and referral was entered into [facility]. POLLY spoke with Sintia with [facility] and she states she will check with the office to see if they have staffing to accept and let me know. CM will follow.                   Discharge Codes    No documentation.                 Expected Discharge Date and Time       Expected Discharge Date Expected Discharge Time    Apr 8, 2025               Fouzia Escobar RN

## 2025-04-09 NOTE — CASE MANAGEMENT/SOCIAL WORK
Discharge Planning Assessment  JUSTIN Pantoja     Patient Name: Nikki Merino  MRN: [id number]  Today's Date: 4/9/2025    Admit Date: 4/3/2025    Plan: Home with  and accepting HH   Discharge Needs Assessment    No documentation.                  Discharge Plan       Row Name 04/09/25 0706       Plan    Final Discharge Disposition Code 06 - home with home health care    Final Note DC'd home with [facility]                   Continued Care and Services - Discharged on 4/8/2025 Admission date: 4/3/2025 - Discharge disposition: Home-Health Care [facility]      Home Medical Care       Service Provider Request Status Services Address Phone Fax Patient Preferred    [facility] [address], [address], [place] [address] [phone] --                  Expected Discharge Date and Time       Expected Discharge Date Expected Discharge Time    Apr 8, 2025            Demographic Summary    No documentation.                  Functional Status    No documentation.                  Psychosocial    No documentation.                  Abuse/Neglect    No documentation.                  Legal    No documentation.                  Substance Abuse    No documentation.                  Patient Forms    No documentation.                     Fouzia Escobar, RN

## 2025-04-09 NOTE — OUTREACH NOTE
Prep Survey      Flowsheet Row Responses   [facility] facility patient discharged from? [facility]   Is LACE score < 7 ? No   Eligibility Readm Mgmt   Discharge diagnosis Multifocal pneumonia, Sepsis   Does the patient have one of the following disease processes/diagnoses(primary or secondary)? Sepsis   Does the patient have Home health ordered? Yes   What is the Home health agency?  [facility]   Is there a DME ordered? No   Prep survey completed? Yes            SWATI WAN - Registered Nurse

## 2025-04-10 LAB — IGE SERPL-ACNC: 11 IU/ML (ref 6–495)

## 2025-04-11 ENCOUNTER — TELEPHONE (OUTPATIENT)
Dept: FAMILY MEDICINE CLINIC | Facility: CLINIC | Age: 75
End: 2025-04-11
Payer: MEDICARE

## 2025-04-11 NOTE — TELEPHONE ENCOUNTER
Caller: DANE VILLA (PRIMARY CONTACT)    Relationship: Emergency Contact    Best call back number: [phone]        What was the call regarding: PATIENT WAS TRYING TO GET MEDICAL RECORDS FROM PAST PCP FOR HER NEW PATIENT APPOINTMENT 4/22/25 AND THE PAST PCP OFFICE IS REQUIRING A MEDICAL RELEASE FROM DR ACEVEDO OFFICE TO BE SENT TO THEM ASKING FOR THE MEDICAL RECORDS    PLEASE GIVE CALLBACK TO LET PATIENTS DAUGHTER KNOW WHAT SHE NEEDS TO DO TO HELP GET THESE RECORDS

## 2025-04-11 NOTE — TELEPHONE ENCOUNTER
"Relay     \"LM: we have record from [facility]. If she has another provider we can have her fill out the form when she comes in\"                "

## 2025-04-16 ENCOUNTER — READMISSION MANAGEMENT (OUTPATIENT)
Dept: CALL CENTER | Facility: HOSPITAL | Age: 75
End: 2025-04-16
Payer: MEDICARE

## 2025-04-16 NOTE — OUTREACH NOTE
Sepsis Week 1 Survey      Flowsheet Row Responses   [facility] patient discharged from? [facility]   Does the patient have one of the following disease processes/diagnoses(primary or secondary)? Sepsis   Week 1 attempt successful? Yes   Call start time 1245   Call end time 1252   Discharge diagnosis Multifocal pneumonia, Sepsis   Person spoke with today (if not patient) and relationship Areli Kennedy---Daughter   Medication alerts for this patient Symbicort, Doxycycline, Nystatin, Prednisone   Meds reviewed with patient/caregiver? Yes   Is the patient having any side effects they believe may be caused by any medication additions or changes? No   Does the patient have all medications related to this admission filled (includes all antibiotics, inhalers, nebulizers,steroids,etc.) Yes   Prescription comments No questions or concerns with medications.   Is the patient taking all medications as directed (includes completed medication regime)? Yes   Comments regarding appointments Daughter states patient has appts for f/u with Pulm and Urology.   Does the patient have a primary care provider?  Yes   Comments regarding PCP NEW PCP appt on 4/22/25 with Dr. Everton Basurto. Daughter states they are changing PCP, unhappy with previous PCP.   Has the patient kept scheduled appointments due by today? N/A   What is the Home health agency?  [facility]   Has [facility] visited the patient within 72 hours of discharge? Yes   Psychosocial issues? No   Comments Daughter states patient still with shortness of breath with exertion. Small tasks fatigue her. Patient does not really want HH coming to the home, but daughter is encouraging patient to let them visit to get the patient stronger.   Did the patient receive a copy of their discharge instructions? Yes   Nursing interventions Reviewed instructions with patient  [with daughter--Areli.]   What is the patient's perception of their health status since  discharge? Improving   Nursing interventions Nurse provided patient education   Is the patient/caregiver able to teach back TIME? T emperature - higher or lower than normal, I nfection - may have signs and symptoms of an infection, M ental Decline - confused, sleepy, difficult to arouse, E xtremely Ill - severe pain, discomfort, shortness of breath   Nursing interventions Nurse provided reassurance to patient   Is patient/caregiver able to teach back steps to recovery at home? Set small, achievable goals for return to baseline health, Rest and regain strength, Exercise as tolerated   Is the patient/caregiver able to teach back signs and symptoms of worsening condition: Fever, Rapid heart rate (>90), Shortness of breath/rapid respiratory rate   Is the patient/caregiver able to teach back the hierarchy of who to call/visit for symptoms/problems? PCP, Specialist, Home health nurse, Urgent Care, ED, 911 Yes   Week 1 call completed? Yes   Graduated Yes   Is the patient interested in additional calls from an ambulatory ? No   Would this patient benefit from a Referral to [facility] Social Work? No   Wrap up additional comments Daughter assisting with all aspects of care. She states she is trying to get a health plan together to improve her mother's health. She states she has been hospitalized 4 times since November. All f/u appts scheduled. No questions, needs or concerns.   Call end time 1252            Michelle CHACKO - Registered Nurse      Michelle CHACKO - Registered Nurse

## 2025-04-17 ENCOUNTER — TRANSCRIBE ORDERS (OUTPATIENT)
Dept: CT IMAGING | Facility: HOSPITAL | Age: 75
End: 2025-04-17
Payer: MEDICARE

## 2025-04-17 DIAGNOSIS — J47.9 ADULT BRONCHIECTASIS: Primary | ICD-10-CM

## 2025-04-22 ENCOUNTER — OFFICE VISIT (OUTPATIENT)
Dept: FAMILY MEDICINE CLINIC | Facility: CLINIC | Age: 75
End: 2025-04-22
Payer: MEDICARE

## 2025-04-22 ENCOUNTER — PATIENT ROUNDING (BHMG ONLY) (OUTPATIENT)
Dept: FAMILY MEDICINE CLINIC | Facility: CLINIC | Age: 75
End: 2025-04-22
Payer: MEDICARE

## 2025-04-22 VITALS
HEIGHT: 64 IN | WEIGHT: 205 LBS | SYSTOLIC BLOOD PRESSURE: 126 MMHG | BODY MASS INDEX: 35 KG/M2 | RESPIRATION RATE: 16 BRPM | OXYGEN SATURATION: 95 % | HEART RATE: 82 BPM | DIASTOLIC BLOOD PRESSURE: 88 MMHG

## 2025-04-22 DIAGNOSIS — E11.65 TYPE 2 DIABETES MELLITUS WITH HYPERGLYCEMIA, WITHOUT LONG-TERM CURRENT USE OF INSULIN: Primary | ICD-10-CM

## 2025-04-22 DIAGNOSIS — E11.9 DIABETES MELLITUS WITHOUT COMPLICATION: ICD-10-CM

## 2025-04-22 DIAGNOSIS — N39.0 RECURRENT UTI: ICD-10-CM

## 2025-04-22 DIAGNOSIS — R41.3 MEMORY IMPAIRMENT: ICD-10-CM

## 2025-04-22 DIAGNOSIS — E11.65 TYPE 2 DIABETES MELLITUS WITH HYPERGLYCEMIA, WITHOUT LONG-TERM CURRENT USE OF INSULIN: ICD-10-CM

## 2025-04-22 PROBLEM — E55.9 VITAMIN D DEFICIENCY: Status: ACTIVE | Noted: 2025-01-27

## 2025-04-22 PROBLEM — F41.8 DEPRESSION WITH ANXIETY: Status: ACTIVE | Noted: 2025-01-17

## 2025-04-22 PROBLEM — E53.8 VITAMIN B 12 DEFICIENCY: Status: ACTIVE | Noted: 2025-01-27

## 2025-04-22 PROBLEM — D48.60 PHYLLODES TUMOR OF BREAST: Status: ACTIVE | Noted: 2025-01-17

## 2025-04-22 PROBLEM — K21.9 GERD (GASTROESOPHAGEAL REFLUX DISEASE): Status: ACTIVE | Noted: 2025-04-22

## 2025-04-22 PROBLEM — J44.9 COPD (CHRONIC OBSTRUCTIVE PULMONARY DISEASE): Status: ACTIVE | Noted: 2025-04-22

## 2025-04-22 PROBLEM — E78.5 DYSLIPIDEMIA: Status: ACTIVE | Noted: 2025-01-17

## 2025-04-22 RX ORDER — SPIRONOLACTONE 50 MG/1
50 TABLET, FILM COATED ORAL EVERY EVENING
COMMUNITY
Start: 2025-03-31

## 2025-04-22 RX ORDER — SERTRALINE HYDROCHLORIDE 25 MG/1
1 TABLET, FILM COATED ORAL DAILY
COMMUNITY
Start: 2025-01-17

## 2025-04-22 RX ORDER — METFORMIN HYDROCHLORIDE 500 MG/1
500 TABLET, EXTENDED RELEASE ORAL
Qty: 90 TABLET | OUTPATIENT
Start: 2025-04-22

## 2025-04-22 RX ORDER — IPRATROPIUM BROMIDE AND ALBUTEROL SULFATE 2.5; .5 MG/3ML; MG/3ML
1 SOLUTION RESPIRATORY (INHALATION) EVERY 6 HOURS
COMMUNITY

## 2025-04-22 RX ORDER — MONTELUKAST SODIUM 10 MG/1
1 TABLET ORAL DAILY
COMMUNITY
Start: 2025-03-03

## 2025-04-22 RX ORDER — METFORMIN HYDROCHLORIDE 500 MG/1
500 TABLET, EXTENDED RELEASE ORAL
Qty: 30 TABLET | Refills: 2 | Status: SHIPPED | OUTPATIENT
Start: 2025-04-22

## 2025-04-22 RX ORDER — WARFARIN SODIUM 5 MG/1
7.5 TABLET ORAL EVERY 24 HOURS
COMMUNITY

## 2025-04-22 NOTE — PROGRESS NOTES
A Urgent Group message has been sent to the patient for PATIENT ROUNDING with [facility]

## 2025-05-11 PROBLEM — E11.65 TYPE 2 DIABETES MELLITUS WITH HYPERGLYCEMIA, WITHOUT LONG-TERM CURRENT USE OF INSULIN: Status: ACTIVE | Noted: 2025-05-11

## 2025-05-11 NOTE — PROGRESS NOTES
Everton Basurto,   [facility] PRIMARY CARE  [address]  [place] [id number] [phone]    Subjective      Name Nikki Merino MRN [id number]    1950 AGE/SEX 75 y.o. / female      Chief Complaint Chief Complaint   Patient presents with    Pneumonia     Here to establish care and hospital follow up. Had apt with pulmonologist already.          Visit History for  2025    Nikki Merino is a 75 y.o. female who presented today for Pneumonia (Here to establish care and hospital follow up. Had apt with pulmonologist already. )       History of Present Illness  The patient presents for a post-hospital visit. She is accompanied by her daughter.    The chief complaint is recurrent urinary tract infections (UTIs) since , necessitating three hospitalizations in [place]. The most recent hospitalization involved an episode of disorientation, inability to stand or walk, and refusal to speak, which required ambulance transport. A urologist's consultation on 2025 revealed clear urine, but a kidney stone in the left kidney and gallstones were identified, although not deemed concerning at this time.     Memory issues are reported, with occasional forgetfulness of small details. No formal mental health evaluations have been conducted, but during her last hospital stay, she was able to recall two out of three words given to her. She uses a calendar to keep track of appointments and checks it daily. Difficulty in getting out of the house, particularly in adverse weather conditions, is noted, along with a lack of energy to engage in household chores or recreational activities. She used to enjoy crocheting but finds it challenging now due to decreased hand function.    Current medications include spironolactone 100 mg and 50 mg, taken once during the day and once at night respectively. Metformin was previously prescribed but discontinued during her hospital stay. Lactulose  was prescribed three times a day for a few weeks, then twice daily, to manage high ammonia levels, but she is not currently taking it. Frequent nighttime urination is reported, although this has recently decreased to twice per night.    She is under the care of Dr. Sánchez, a cardiologist, whom she wishes to continue seeing.     The patient is supposed to be on Symbicort inhaler, but it was either not picked up from the pharmacy or left at home. Oxygen is used at night, typically at 2 or 2.5 liters.    Diabetes has been diagnosed, but it is unclear whether she is currently on medication for this condition. Elevated blood sugar levels during her hospital stay required insulin injections.    She is scheduled to see Dr. Mello Good, a pulmonologist, on Thursday for a CT scan to assess her lung function.       Medications and Allergies   Current Outpatient Medications   Medication Instructions    acetaminophen (TYLENOL) 500 mg, Every 6 Hours PRN    albuterol sulfate  (90 Base) MCG/ACT inhaler 2 puffs, Every 4 Hours PRN    atorvastatin (LIPITOR) 40 mg, Nightly    budesonide-formoterol (SYMBICORT) 160-4.5 MCG/ACT inhaler 2 puffs, Inhalation, 2 Times Daily - RT    fluticasone (FLONASE) 50 MCG/ACT nasal spray 2 sprays, Daily    ipratropium-albuterol (DUO-NEB) 0.5-2.5 mg/3 ml nebulizer 1 vial, Every 6 Hours    lactulose (CHRONULAC) 10 g, 3 Times Daily    metFORMIN ER (GLUCOPHAGE-XR) 500 mg, Oral, Daily With Breakfast    metoprolol succinate XL (TOPROL-XL) 25 mg, Daily    montelukast (SINGULAIR) 10 MG tablet 1 tablet, Daily    nystatin (MYCOSTATIN) 005857 UNIT/GM powder Topical, Every 12 Hours Scheduled    O2 (OXYGEN) 2 L/min, Every Night at Bedtime    sertraline (ZOLOFT) 25 MG tablet 1 tablet, Daily    Spacer/Aero-Holding Chambers (OPTICHAMBER ADVANTAGE) misc Not Applicable    spironolactone (ALDACTONE) 100 mg, Daily    spironolactone (ALDACTONE) 50 mg, Every Evening    vitamin B-12 (CYANOCOBALAMIN) 100 mcg, Daily     "warfarin (COUMADIN) 7.5 mg, Daily Warfarin    warfarin (COUMADIN) 7.5 mg, Oral, Every 24 Hours     Allergies   Allergen Reactions    Ciprofloxacin     Diltiazem Nausea Only    Fenofibrate Nausea Only    Meperidine And Related     Penicillins     Rosuvastatin Other (See Comments)     JOINT PAIN      I have reviewed the above medications and allergies     Objective:      Vitals Vitals:    04/22/25 1118   BP: 126/88   BP Location: Left arm   Patient Position: Sitting   Cuff Size: Large Adult   Pulse: 82   Resp: 16   SpO2: 95%   Weight: 93 kg (205 lb)   Height: 162.6 cm (64\")     Body mass index is 35.19 kg/m².    Physical Exam  Vitals reviewed.   Constitutional:       General: She is not in acute distress.     Appearance: She is not ill-appearing.   Pulmonary:      Effort: Pulmonary effort is normal.   Psychiatric:         Mood and Affect: Mood normal.         Behavior: Behavior normal.         Thought Content: Thought content normal.         Judgment: Judgment normal.          Physical Exam       Results  Labs   - A1c: 7.5     Assessment/Plan   Issues Addressed/ Plan   Diagnosis Plan   1. Type 2 diabetes mellitus with hyperglycemia, without long-term current use of insulin  metFORMIN ER (GLUCOPHAGE-XR) 500 MG 24 hr tablet      2. Recurrent UTI        3. Memory impairment        4. Diabetes mellitus without complication           Assessment & Plan  1. Recurrent urinary tract infections (UTIs).  - She has been experiencing recurrent UTIs since Thanksgiving, with multiple hospitalizations.  - The urologist reported clear urine and identified a kidney stone in the left kidney and gallstones, but these were not deemed concerning at this time.  - A urine culture will be conducted to identify any bacterial presence.  - She is advised to maintain good hygiene and monitor for symptoms of UTIs. If symptoms recur, she should provide a urine sample for culture.    2. Memory loss.  - She experiences memory issues, particularly " during severe UTIs. No formal diagnosis has been made.  - She passed a mini cognitive exam by recalling 2 out of 3 words.  - She is advised to engage in activities that stimulate her mind, such as reading, writing, or other hobbies.  - She should also orient herself daily by reviewing her calendar and appointments.    3. Diabetes mellitus.  - Her A1c level is 7.5, indicating diabetes.  - She was previously on metformin but is not currently taking it.  - Metformin 500 mg once daily will be restarted.  - She is advised to monitor for any gastrointestinal side effects such as diarrhea. Labs will be repeated in 2 months to assess blood sugar control.    4. Medication management.  - She is currently taking montelukast, atorvastatin, warfarin, sertraline, metoprolol, spironolactone, and B12. She uses a nebulizer and rescue inhaler.  - She is advised to check if she has the Symbicort inhaler at home and inform the office if a refill is needed.  - She is advised to establish a power of  (POA) to assist with medical decisions due to her memory issues and her 's health concerns.  - Information on end-of-life planning will be provided.    Follow-up  The patient will follow up in 2 months.          There are no Patient Instructions on file for this visit.   Follow up  recommended Return in about 2 months (around 6/22/2025) for Diabetes.   - Dragon voice recognition software was utilized to complete this chart.  Every reasonable attempt was made to edit and correct the text, however some incorrect words may remain.   Everton Basurto DO    Patient or patient representative verbalized consent for the use of Ambient Listening during the visit with  Everton Basurto DO for chart documentation. 5/11/2025  13:10 EDT

## 2025-05-13 RX ORDER — WARFARIN SODIUM 7.5 MG/1
7.5 TABLET ORAL
Qty: 90 TABLET | Refills: 0 | Status: SHIPPED | OUTPATIENT
Start: 2025-05-13

## 2025-05-13 NOTE — TELEPHONE ENCOUNTER
Rx Refill Note  Requested Prescriptions     Signed Prescriptions Disp Refills    warfarin (COUMADIN) 7.5 MG tablet 90 tablet 0     Sig: Take 1 tablet by mouth Daily.     Authorizing Provider: PHILIP HERNANDEZ     Ordering User: NANCI CHU      Last office visit with prescribing clinician: 4/22/2025   Last telemedicine visit with prescribing clinician: Visit date not found   Next office visit with prescribing clinician: 6/24/2025   {

## 2025-05-29 ENCOUNTER — TELEPHONE (OUTPATIENT)
Dept: FAMILY MEDICINE CLINIC | Facility: CLINIC | Age: 75
End: 2025-05-29
Payer: MEDICARE

## 2025-05-29 NOTE — TELEPHONE ENCOUNTER
Patient's daughter called, requesting a refill on UTI medication. She says that Nikki is experiencing confusion and this is an indication that she has a UTI per daughter. It is very difficult to get her to go to the doctor. She says this was discussed at last office visit 4/22/2025.

## 2025-05-30 ENCOUNTER — CLINICAL SUPPORT (OUTPATIENT)
Dept: FAMILY MEDICINE CLINIC | Facility: CLINIC | Age: 75
End: 2025-05-30
Payer: MEDICARE

## 2025-05-30 DIAGNOSIS — N39.0 URINARY TRACT INFECTION WITHOUT HEMATURIA, SITE UNSPECIFIED: Primary | ICD-10-CM

## 2025-05-30 LAB
BILIRUB BLD-MCNC: ABNORMAL MG/DL
EXPIRATION DATE: ABNORMAL
GLUCOSE UR STRIP-MCNC: NEGATIVE MG/DL
KETONES UR QL: ABNORMAL
LEUKOCYTE EST, POC: ABNORMAL
Lab: ABNORMAL
NITRITE UR-MCNC: NEGATIVE MG/ML
PH UR: 6 [PH] (ref 5–8)
PROT UR STRIP-MCNC: ABNORMAL MG/DL
RBC # UR STRIP: ABNORMAL /UL
SP GR UR: 1.02 (ref 1–1.03)
UROBILINOGEN UR QL: ABNORMAL

## 2025-05-30 PROCEDURE — 81003 URINALYSIS AUTO W/O SCOPE: CPT | Performed by: STUDENT IN AN ORGANIZED HEALTH CARE EDUCATION/TRAINING PROGRAM

## 2025-05-30 RX ORDER — NITROFURANTOIN 25; 75 MG/1; MG/1
100 CAPSULE ORAL 2 TIMES DAILY
Qty: 14 CAPSULE | Refills: 0 | Status: SHIPPED | OUTPATIENT
Start: 2025-05-30 | End: 2025-06-06

## 2025-06-04 ENCOUNTER — APPOINTMENT (OUTPATIENT)
Dept: CT IMAGING | Facility: HOSPITAL | Age: 75
End: 2025-06-04
Payer: MEDICARE

## 2025-06-04 ENCOUNTER — HOSPITAL ENCOUNTER (EMERGENCY)
Facility: HOSPITAL | Age: 75
Discharge: HOME | End: 2025-06-04
Attending: EMERGENCY MEDICINE | Admitting: EMERGENCY MEDICINE
Payer: MEDICARE

## 2025-06-04 ENCOUNTER — APPOINTMENT (OUTPATIENT)
Dept: GENERAL RADIOLOGY | Facility: HOSPITAL | Age: 75
End: 2025-06-04
Payer: MEDICARE

## 2025-06-04 VITALS
WEIGHT: 206 LBS | OXYGEN SATURATION: 92 % | HEIGHT: 65 IN | SYSTOLIC BLOOD PRESSURE: 132 MMHG | DIASTOLIC BLOOD PRESSURE: 93 MMHG | HEART RATE: 88 BPM | BODY MASS INDEX: 34.32 KG/M2 | RESPIRATION RATE: 16 BRPM | TEMPERATURE: 98 F

## 2025-06-04 DIAGNOSIS — E86.0 DEHYDRATION: ICD-10-CM

## 2025-06-04 DIAGNOSIS — R31.9 URINARY TRACT INFECTION WITH HEMATURIA, SITE UNSPECIFIED: Primary | ICD-10-CM

## 2025-06-04 DIAGNOSIS — N39.0 URINARY TRACT INFECTION WITH HEMATURIA, SITE UNSPECIFIED: Primary | ICD-10-CM

## 2025-06-04 LAB
ALBUMIN SERPL-MCNC: 3.3 G/DL (ref 3.5–5.2)
ALBUMIN SERPL-MCNC: 3.6 G/DL (ref 3.5–5.2)
ALBUMIN/GLOB SERPL: 1 G/DL
ALBUMIN/GLOB SERPL: 1.1 G/DL
ALP SERPL-CCNC: 84 U/L (ref 39–117)
ALP SERPL-CCNC: 88 U/L (ref 39–117)
ALT SERPL W P-5'-P-CCNC: 19 U/L (ref 1–33)
ALT SERPL W P-5'-P-CCNC: 22 U/L (ref 1–33)
ANION GAP SERPL CALCULATED.3IONS-SCNC: 14.5 MMOL/L (ref 5–15)
ANION GAP SERPL CALCULATED.3IONS-SCNC: 16.4 MMOL/L (ref 5–15)
AST SERPL-CCNC: 27 U/L (ref 1–32)
AST SERPL-CCNC: 35 U/L (ref 1–32)
BACTERIA UR CULT: ABNORMAL
BACTERIA UR QL AUTO: ABNORMAL /HPF
BASOPHILS # BLD AUTO: 0.07 10*3/MM3 (ref 0–0.2)
BASOPHILS # BLD AUTO: 0.07 10*3/MM3 (ref 0–0.2)
BASOPHILS NFR BLD AUTO: 0.7 % (ref 0–1.5)
BASOPHILS NFR BLD AUTO: 0.7 % (ref 0–1.5)
BILIRUB SERPL-MCNC: 2.1 MG/DL (ref 0–1.2)
BILIRUB SERPL-MCNC: 2.7 MG/DL (ref 0–1.2)
BILIRUB UR QL STRIP: ABNORMAL
BUN SERPL-MCNC: 12.2 MG/DL (ref 8–23)
BUN SERPL-MCNC: 14.4 MG/DL (ref 8–23)
BUN/CREAT SERPL: 14.7 (ref 7–25)
BUN/CREAT SERPL: 14.7 (ref 7–25)
CALCIUM SPEC-SCNC: 10.3 MG/DL (ref 8.6–10.5)
CALCIUM SPEC-SCNC: 10.8 MG/DL (ref 8.6–10.5)
CHLORIDE SERPL-SCNC: 101 MMOL/L (ref 98–107)
CHLORIDE SERPL-SCNC: 102 MMOL/L (ref 98–107)
CLARITY UR: CLEAR
CO2 SERPL-SCNC: 19.6 MMOL/L (ref 22–29)
CO2 SERPL-SCNC: 22.5 MMOL/L (ref 22–29)
COLOR UR: YELLOW
CREAT SERPL-MCNC: 0.83 MG/DL (ref 0.57–1)
CREAT SERPL-MCNC: 0.98 MG/DL (ref 0.57–1)
D-LACTATE SERPL-SCNC: 2.3 MMOL/L (ref 0.5–2)
D-LACTATE SERPL-SCNC: 2.4 MMOL/L (ref 0.5–2)
DEPRECATED RDW RBC AUTO: 48.4 FL (ref 37–54)
DEPRECATED RDW RBC AUTO: 49.1 FL (ref 37–54)
EGFRCR SERPLBLD CKD-EPI 2021: 59.6 ML/MIN/1.73
EGFRCR SERPLBLD CKD-EPI 2021: 72.7 ML/MIN/1.73
EOSINOPHIL # BLD AUTO: 0.18 10*3/MM3 (ref 0–0.4)
EOSINOPHIL # BLD AUTO: 0.18 10*3/MM3 (ref 0–0.4)
EOSINOPHIL NFR BLD AUTO: 1.7 % (ref 0.3–6.2)
EOSINOPHIL NFR BLD AUTO: 1.8 % (ref 0.3–6.2)
ERYTHROCYTE [DISTWIDTH] IN BLOOD BY AUTOMATED COUNT: 14.2 % (ref 12.3–15.4)
ERYTHROCYTE [DISTWIDTH] IN BLOOD BY AUTOMATED COUNT: 14.8 % (ref 12.3–15.4)
GEN 5 1HR TROPONIN T REFLEX: 19 NG/L
GLOBULIN UR ELPH-MCNC: 3.3 GM/DL
GLOBULIN UR ELPH-MCNC: 3.4 GM/DL
GLUCOSE BLDC GLUCOMTR-MCNC: 108 MG/DL (ref 70–130)
GLUCOSE SERPL-MCNC: 108 MG/DL (ref 65–99)
GLUCOSE SERPL-MCNC: 88 MG/DL (ref 65–99)
GLUCOSE UR STRIP-MCNC: ABNORMAL MG/DL
HCT VFR BLD AUTO: 54.9 % (ref 34–46.6)
HCT VFR BLD AUTO: 56.2 % (ref 34–46.6)
HGB BLD-MCNC: 18.1 G/DL (ref 12–15.9)
HGB BLD-MCNC: 18.8 G/DL (ref 12–15.9)
HGB UR QL STRIP.AUTO: ABNORMAL
HOLD SPECIMEN: NORMAL
HOLD SPECIMEN: NORMAL
HYALINE CASTS UR QL AUTO: ABNORMAL /LPF
IMM GRANULOCYTES # BLD AUTO: 0.03 10*3/MM3 (ref 0–0.05)
IMM GRANULOCYTES # BLD AUTO: 0.05 10*3/MM3 (ref 0–0.05)
IMM GRANULOCYTES NFR BLD AUTO: 0.3 % (ref 0–0.5)
IMM GRANULOCYTES NFR BLD AUTO: 0.5 % (ref 0–0.5)
INR PPP: 3.42 (ref 0.9–1.1)
KETONES UR QL STRIP: NEGATIVE
LEUKOCYTE ESTERASE UR QL STRIP.AUTO: ABNORMAL
LYMPHOCYTES # BLD AUTO: 2.99 10*3/MM3 (ref 0.7–3.1)
LYMPHOCYTES # BLD AUTO: 3.33 10*3/MM3 (ref 0.7–3.1)
LYMPHOCYTES NFR BLD AUTO: 30.6 % (ref 19.6–45.3)
LYMPHOCYTES NFR BLD AUTO: 32.1 % (ref 19.6–45.3)
MAGNESIUM SERPL-MCNC: 1.9 MG/DL (ref 1.6–2.4)
MCH RBC QN AUTO: 30.7 PG (ref 26.6–33)
MCH RBC QN AUTO: 31 PG (ref 26.6–33)
MCHC RBC AUTO-ENTMCNC: 33 G/DL (ref 31.5–35.7)
MCHC RBC AUTO-ENTMCNC: 33.5 G/DL (ref 31.5–35.7)
MCV RBC AUTO: 92.7 FL (ref 79–97)
MCV RBC AUTO: 93.1 FL (ref 79–97)
MONOCYTES # BLD AUTO: 0.91 10*3/MM3 (ref 0.1–0.9)
MONOCYTES # BLD AUTO: 1.09 10*3/MM3 (ref 0.1–0.9)
MONOCYTES NFR BLD AUTO: 10.5 % (ref 5–12)
MONOCYTES NFR BLD AUTO: 9.3 % (ref 5–12)
NEUTROPHILS NFR BLD AUTO: 5.58 10*3/MM3 (ref 1.7–7)
NEUTROPHILS NFR BLD AUTO: 5.69 10*3/MM3 (ref 1.7–7)
NEUTROPHILS NFR BLD AUTO: 54.7 % (ref 42.7–76)
NEUTROPHILS NFR BLD AUTO: 57.1 % (ref 42.7–76)
NITRITE UR QL STRIP: NEGATIVE
NRBC BLD AUTO-RTO: 0 /100 WBC (ref 0–0.2)
NRBC BLD AUTO-RTO: 0 /100 WBC (ref 0–0.2)
OTHER ANTIBIOTIC SUSC ISLT: ABNORMAL
PH UR STRIP.AUTO: 5.5 [PH] (ref 4.5–8)
PHOSPHATE SERPL-MCNC: 3.3 MG/DL (ref 2.5–4.5)
PLATELET # BLD AUTO: 252 10*3/MM3 (ref 140–450)
PLATELET # BLD AUTO: 283 10*3/MM3 (ref 140–450)
PMV BLD AUTO: 10.9 FL (ref 6–12)
PMV BLD AUTO: 9.9 FL (ref 6–12)
POTASSIUM SERPL-SCNC: 4.7 MMOL/L (ref 3.5–5.2)
POTASSIUM SERPL-SCNC: 4.8 MMOL/L (ref 3.5–5.2)
PROT SERPL-MCNC: 6.6 G/DL (ref 6–8.5)
PROT SERPL-MCNC: 7 G/DL (ref 6–8.5)
PROT UR QL STRIP: ABNORMAL
PROTHROMBIN TIME: 35.9 SECONDS (ref 12.1–15)
QT INTERVAL: 378 MS
QTC INTERVAL: 452 MS
RBC # BLD AUTO: 5.9 10*6/MM3 (ref 3.77–5.28)
RBC # BLD AUTO: 6.06 10*6/MM3 (ref 3.77–5.28)
RBC # UR STRIP: ABNORMAL /HPF
REF LAB TEST METHOD: ABNORMAL
SODIUM SERPL-SCNC: 138 MMOL/L (ref 136–145)
SODIUM SERPL-SCNC: 138 MMOL/L (ref 136–145)
SP GR UR STRIP: 1.02 (ref 1–1.03)
SQUAMOUS #/AREA URNS HPF: ABNORMAL /HPF
TROPONIN T % DELTA: 12
TROPONIN T NUMERIC DELTA: 2 NG/L
TROPONIN T SERPL HS-MCNC: 17 NG/L
UROBILINOGEN UR QL STRIP: ABNORMAL
WBC # UR STRIP: ABNORMAL /HPF
WBC NRBC COR # BLD AUTO: 10.39 10*3/MM3 (ref 3.4–10.8)
WBC NRBC COR # BLD AUTO: 9.78 10*3/MM3 (ref 3.4–10.8)
WHOLE BLOOD HOLD COAG: NORMAL
WHOLE BLOOD HOLD SPECIMEN: NORMAL

## 2025-06-04 PROCEDURE — 70450 CT HEAD/BRAIN W/O DYE: CPT

## 2025-06-04 PROCEDURE — 80053 COMPREHEN METABOLIC PANEL: CPT | Performed by: EMERGENCY MEDICINE

## 2025-06-04 PROCEDURE — 93010 ELECTROCARDIOGRAM REPORT: CPT | Performed by: INTERNAL MEDICINE

## 2025-06-04 PROCEDURE — 84100 ASSAY OF PHOSPHORUS: CPT | Performed by: EMERGENCY MEDICINE

## 2025-06-04 PROCEDURE — 80053 COMPREHEN METABOLIC PANEL: CPT | Performed by: STUDENT IN AN ORGANIZED HEALTH CARE EDUCATION/TRAINING PROGRAM

## 2025-06-04 PROCEDURE — 71045 X-RAY EXAM CHEST 1 VIEW: CPT

## 2025-06-04 PROCEDURE — 36415 COLL VENOUS BLD VENIPUNCTURE: CPT

## 2025-06-04 PROCEDURE — 84484 ASSAY OF TROPONIN QUANT: CPT | Performed by: EMERGENCY MEDICINE

## 2025-06-04 PROCEDURE — 85610 PROTHROMBIN TIME: CPT | Performed by: EMERGENCY MEDICINE

## 2025-06-04 PROCEDURE — 83735 ASSAY OF MAGNESIUM: CPT | Performed by: EMERGENCY MEDICINE

## 2025-06-04 PROCEDURE — 87086 URINE CULTURE/COLONY COUNT: CPT | Performed by: EMERGENCY MEDICINE

## 2025-06-04 PROCEDURE — 83605 ASSAY OF LACTIC ACID: CPT | Performed by: EMERGENCY MEDICINE

## 2025-06-04 PROCEDURE — 96365 THER/PROPH/DIAG IV INF INIT: CPT

## 2025-06-04 PROCEDURE — 25010000002 CEFTRIAXONE PER 250 MG: Performed by: EMERGENCY MEDICINE

## 2025-06-04 PROCEDURE — 85025 COMPLETE CBC W/AUTO DIFF WBC: CPT | Performed by: STUDENT IN AN ORGANIZED HEALTH CARE EDUCATION/TRAINING PROGRAM

## 2025-06-04 PROCEDURE — 87040 BLOOD CULTURE FOR BACTERIA: CPT | Performed by: EMERGENCY MEDICINE

## 2025-06-04 PROCEDURE — 81001 URINALYSIS AUTO W/SCOPE: CPT | Performed by: EMERGENCY MEDICINE

## 2025-06-04 PROCEDURE — 85025 COMPLETE CBC W/AUTO DIFF WBC: CPT | Performed by: EMERGENCY MEDICINE

## 2025-06-04 PROCEDURE — 25810000003 LACTATED RINGERS SOLUTION: Performed by: STUDENT IN AN ORGANIZED HEALTH CARE EDUCATION/TRAINING PROGRAM

## 2025-06-04 PROCEDURE — 93005 ELECTROCARDIOGRAM TRACING: CPT | Performed by: EMERGENCY MEDICINE

## 2025-06-04 PROCEDURE — 99284 EMERGENCY DEPT VISIT MOD MDM: CPT | Performed by: EMERGENCY MEDICINE

## 2025-06-04 PROCEDURE — 82948 REAGENT STRIP/BLOOD GLUCOSE: CPT

## 2025-06-04 PROCEDURE — 25810000003 LACTATED RINGERS SOLUTION: Performed by: EMERGENCY MEDICINE

## 2025-06-04 RX ORDER — CEPHALEXIN 500 MG/1
500 CAPSULE ORAL 2 TIMES DAILY
Qty: 14 CAPSULE | Refills: 0 | Status: SHIPPED | OUTPATIENT
Start: 2025-06-04 | End: 2025-06-11

## 2025-06-04 RX ORDER — SODIUM CHLORIDE 0.9 % (FLUSH) 0.9 %
10 SYRINGE (ML) INJECTION AS NEEDED
Status: DISCONTINUED | OUTPATIENT
Start: 2025-06-04 | End: 2025-06-04 | Stop reason: HOSPADM

## 2025-06-04 RX ADMIN — SODIUM CHLORIDE 2000 MG: 9 INJECTION, SOLUTION INTRAVENOUS at 12:53

## 2025-06-04 RX ADMIN — SODIUM CHLORIDE, POTASSIUM CHLORIDE, SODIUM LACTATE AND CALCIUM CHLORIDE 1000 ML: 600; 310; 30; 20 INJECTION, SOLUTION INTRAVENOUS at 12:56

## 2025-06-04 RX ADMIN — SODIUM CHLORIDE, POTASSIUM CHLORIDE, SODIUM LACTATE AND CALCIUM CHLORIDE 1000 ML: 600; 310; 30; 20 INJECTION, SOLUTION INTRAVENOUS at 16:24

## 2025-06-04 NOTE — ED PROVIDER NOTES
Subjective   History of Present Illness  Patient presents with complaints of generalized weakness and mental status decline that has been going on for about 2 months.  Family says they feel like it is getting worse.  In the past and patient's been this way she has had either a UTI or pneumonia.  Patient currently is being treated for UTI and started Macrobid 5 days ago.  Patient's had some generalized fatigue and a dry cough but she is always got a dry cough.  Patient's family contacted PCP and they advised coming to the hospital for fluids and IV antibiotics.  Patient stable on arrival.  Nothing seems to make it better or worse.  Family describes some of the mental declining such that patient has not been eating or drinking very much for the last 3 days and having trouble taking care of herself such as dressing and washing.  No recent travel, sick contacts or bad food exposure, trauma.  Multiple previous episodes.  Patient was seen by PCP on 4/22 for similar and it was noted that patient's actually been having recurrent UTIs since November 2024.  It was also noted that patient has difficulty getting in and out of the house and she has a lack of energy while doing stuff around the house.  Patient also used to have a hobby such as crocheting but now is having difficulty due to decreased hand function.  No recent changes in medications and no recent hospitalizations or surgeries.      Review of Systems   All other systems reviewed and are negative.      Past Medical History:   Diagnosis Date    Atrial fibrillation     COPD (chronic obstructive pulmonary disease)     use 2 LPM NC    Diabetes mellitus     GERD (gastroesophageal reflux disease)     Hypertension     Urinary tract infection        Allergies   Allergen Reactions    Ciprofloxacin     Diltiazem Nausea Only    Fenofibrate Nausea Only    Meperidine And Related     Penicillins     Rosuvastatin Other (See Comments)     JOINT PAIN       Past Surgical History:    Procedure Laterality Date    LUNG REMOVAL, PARTIAL      LEFT LOWER LUNG       Family History   Problem Relation Age of Onset    Heart disease Mother     Leukemia Mother     Stomach cancer Father        Social History     Socioeconomic History    Marital status:    Tobacco Use    Smoking status: Never     Passive exposure: Never    Smokeless tobacco: Never    Tobacco comments:     EXPOSED TO 2ND HAND SMOKE   Vaping Use    Vaping status: Never Used   Substance and Sexual Activity    Alcohol use: No    Drug use: Never    Sexual activity: Defer           Objective   Physical Exam  Vitals and nursing note reviewed.   Constitutional:       Comments: Patient lying in bed, pleasantly confused, friendly, talkative, cooperative with exam.  No signs of distress.  Family members are present.   HENT:      Head: Normocephalic.      Right Ear: External ear normal.      Left Ear: External ear normal.      Nose: Nose normal.      Mouth/Throat:      Mouth: Mucous membranes are moist.      Pharynx: Oropharynx is clear.   Eyes:      Conjunctiva/sclera: Conjunctivae normal.   Cardiovascular:      Rate and Rhythm: Normal rate and regular rhythm.      Heart sounds: Normal heart sounds.   Pulmonary:      Effort: Pulmonary effort is normal.      Breath sounds: Normal breath sounds. No stridor. No wheezing, rhonchi or rales.   Abdominal:      General: There is no distension.      Palpations: Abdomen is soft.      Tenderness: There is no abdominal tenderness.   Musculoskeletal:         General: No swelling or tenderness.      Cervical back: Neck supple.   Skin:     General: Skin is warm and dry.      Capillary Refill: Capillary refill takes 2 to 3 seconds.   Neurological:      General: No focal deficit present.      Mental Status: She is alert. Mental status is at baseline.   Psychiatric:         Mood and Affect: Mood normal.         Behavior: Behavior normal.         Procedures           ED Course  ED Course as of 06/05/25 0427    Wed Jun 04, 2025   1115 EKG-rate of 86, atrial fibrillation, right bundle branch block, normal axis, nonspecific T wave abnormalities seen in leads diffusely.  When compared to EKG from 4/3/2025 it is generally unchanged. [AW]   1852 I reviewed the lab work on this patient.  Initial CMP shows a glucose of 108, creatinine of 0.98, sodium 138, potassium 4.8.  Bicarb is 19.6.  Calcium 10.8.  ALT 22, AST 35.  Total bilirubin 2.7.  Anion gap 16.4.  Likely dehydration.  After IV fluids, I rechecked the CMP.  Shows glucose of 88, potassium 4.7, creatinine 0.83, sodium 138.  Bicarb also improved to 22.5.  Calcium 10.3.  Improved.  ALT 19.  AST 27, total bilirubin 2.1, likely reactive.  Anion gap 14.5.  This is normal.  CBC shows a white blood cell count of 10.3.  Reassuring against severe infection or inflammation.  Hemoglobin 18.1.  Likely some mild hemoconcentration.  Platelet is 283.  Urinalysis does show small leuk esterase and trace blood.  She does have bacteria but also has squamous cells.  She is already on Macrobid for UTI treatment.  Her lactate was initially elevated 2.4.  After 200 cc (the IV was not running), lactate is 2.3.  Heading in the right direction.  This is likely because of dehydration.  She has no fever.  No white blood cell count elevation.  Doubt an infection.    I also reviewed the CT scan of the head.  The patient has mild generalized parenchymal volume loss and changes of chronic small vessel ischemic disease.  However, no acute intracranial abnormalities.  I suspect that the patient may have some mild dementia.  I also reviewed the chest x-ray.  The patient has cardiomegaly and pulmonary vascular congestion.  New small left pleural effusion.  No focal airspace consolidation.  However, she has no desaturation in the ED.  No cough.  No shortness of breath.  I suspect some mild component of CHF/cardiomegaly.  This will require outpatient evaluation but I do not think that she needs to be  admitted to the hospital. [DL]      ED Course User Index  [AW] Reagan Keller MD  [DL] Kalin Knott MD                                                       Medical Decision Making  Ddx urinary tract infection, pneumonia, electrolyte abnormality, hypoglycemia, dehydration, progressing dementia    XR Chest 1 View  Result Date: 6/4/2025  Impression: 1. Cardiomegaly and pulmonary vascular congestion 2. New small left pleural effusion. No focal airspace consolidation. Electronically Signed: Mello Rodriguez MD  6/4/2025 12:40 PM EDT  Workstation ID: [id number]    CT Head Without Contrast  Result Date: 6/4/2025  Impression: 1. Mild generalized parenchymal volume loss and changes of chronic small vessel ischemic disease. No acute intracranial abnormality. Electronically Signed: Mello Rodriguez MD  6/4/2025 12:35 PM EDT  Workstation ID: [id number]    Labs Reviewed  COMPREHENSIVE METABOLIC PANEL - Abnormal; Notable for the following components:     Glucose                       108 (*)                CO2                           19.6 (*)               Calcium                       10.8 (*)               AST (SGOT)                    35 (*)                 Total Bilirubin               2.7 (*)                Anion Gap                     16.4 (*)               eGFR                          59.6 (*)            All other components within normal limits         Narrative: GFR Categories in Chronic Kidney Disease (CKD)                                              GFR Category          GFR (mL/min/1.73)    Interpretation                  G1                    90 or greater        Normal or high (1)                  G2                    60-89                Mild decrease (1)                  G3a                   45-59                Mild to moderate decrease                  G3b                   30-44                Moderate to severe decrease                  G4                    15-29                Severe decrease                   G5                    14 or less           Kidney failure                                    (1)In the absence of evidence of kidney disease, neither GFR category G1 or G2 fulfill the criteria for CKD.                                    eGFR calculation 2021 CKD-EPI creatinine equation, which does not include race as a factor  LACTIC ACID, PLASMA - Abnormal; Notable for the following components:     Lactate                       2.4 (*)             All other components within normal limits  PROTIME-INR - Abnormal; Notable for the following components:     Protime                       35.9 (*)               INR                           3.42 (*)            All other components within normal limits         Narrative: Therapeutic Ranges for INR: 2.0-3.0 (PT 20-30)                                              2.5-3.5 (PT 25-34)  CBC WITH AUTO DIFFERENTIAL - Abnormal; Notable for the following components:     RBC                           6.06 (*)               Hemoglobin                    18.8 (*)               Hematocrit                    56.2 (*)               Monocytes, Absolute           0.91 (*)            All other components within normal limits  TROPONIN - Abnormal; Notable for the following components:     HS Troponin T                 17 (*)              All other components within normal limits         Narrative: High Sensitive Troponin T Reference Range:                  <14.0 ng/L- Negative Female for AMI                  <22.0 ng/L- Negative Male for AMI                  >=14 - Abnormal Female indicating possible myocardial injury.                  >=22 - Abnormal Male indicating possible myocardial injury.                   Clinicians would have to utilize clinical acumen, EKG, Troponin, and serial changes to determine if it is an Acute Myocardial Infarction or myocardial injury due to an underlying chronic condition.                                       URINALYSIS W/ CULTURE IF INDICATED -  Abnormal; Notable for the following components:     Glucose, UA                     (*)                  Bilirubin, UA                 Small (1+) (*)               Blood, UA                     Trace (*)               Protein, UA                     (*)                  Leuk Esterase, UA             Small (1+) (*)            All other components within normal limits         Narrative: In absence of clinical symptoms, the presence of pyuria, bacteria, and/or nitrites on the urinalysis result does not correlate with infection.  HIGH SENSITIVITIY TROPONIN T 1HR - Abnormal; Notable for the following components:     HS Troponin T                 19 (*)              All other components within normal limits         Narrative: High Sensitive Troponin T Reference Range:                  <14.0 ng/L- Negative Female for AMI                  <22.0 ng/L- Negative Male for AMI                  >=14 - Abnormal Female indicating possible myocardial injury.                  >=22 - Abnormal Male indicating possible myocardial injury.                   Clinicians would have to utilize clinical acumen, EKG, Troponin, and serial changes to determine if it is an Acute Myocardial Infarction or myocardial injury due to an underlying chronic condition.                                       LACTIC ACID, REFLEX - Abnormal; Notable for the following components:     Lactate                       2.3 (*)             All other components within normal limits  URINALYSIS, MICROSCOPIC ONLY - Abnormal; Notable for the following components:     RBC, UA                       3-5 (*)                WBC, UA                       6-10 (*)               Bacteria, UA                  1+ (*)                 Squamous Epithelial Cells, UA   13-20 (*)            All other components within normal limits  PHOSPHORUS - Normal  MAGNESIUM - Normal  POCT GLUCOSE FINGERSTICK - Normal  BLOOD CULTURE  BLOOD CULTURE  URINE CULTURE  RAINBOW DRAW         Narrative: The  following orders were created for panel order [place] Draw.                  Procedure                               Abnormality         Status                                     ---------                               -----------         ------                                     Green Top (Gel)[[id number]]                                  Final result                               Lavender Top[[id number]]                                     Final result                               Gold Top - SST[[id number]]                                   Final result                               Light Blue Top[[id number]]                                   Final result                                                 Please view results for these tests on the individual orders.  LACTIC ACID, REFLEX  POCT PROTIME - INR  CBC AND DIFFERENTIAL         Narrative: The following orders were created for panel order CBC & Differential.                  Procedure                               Abnormality         Status                                     ---------                               -----------         ------                                     CBC Auto Differential[[id number]]        Abnormal            Final result                                                 Please view results for these tests on the individual orders.  GREEN TOP  LAVENDER TOP  GOLD TOP - SST  LIGHT BLUE TOP      Problems Addressed:  Dehydration: complicated acute illness or injury  Urinary tract infection with hematuria, site unspecified: complicated acute illness or injury    Amount and/or Complexity of Data Reviewed  Labs: ordered.  Radiology: ordered.  ECG/medicine tests: ordered.    Risk  Prescription drug management.        Final diagnoses:   Urinary tract infection with hematuria, site unspecified   Dehydration       ED Disposition  ED Disposition       ED Disposition   Discharge    Condition   Stable    Comment   --               No follow-up  provider specified.       Medication List        New Prescriptions      cephalexin 500 MG capsule  Commonly known as: KEFLEX  Take 1 capsule by mouth 2 (Two) Times a Day for 7 days.               Where to Get Your Medications        These medications were sent to [id number] - [place], [place] - [address] AT [address] - [phone] PH - [phone] FX  [address] [address] [place] [address]      Phone: [phone]   cephalexin 500 MG capsule            Reagan Keller MD  06/04/25 2780       Reagan Keller MD  06/05/25 8564

## 2025-06-04 NOTE — DISCHARGE INSTRUCTIONS
Thank you for your visit to the Emergency Department.     It was a pleasure to take care of you in the emergency room today.     Today you were seen for health decline . We performed a thorough history and physical exam.  We did see that you had dehydration on exam and on lab work.  We provided you with IV antibiotics, IV fluid to help with your symptoms.  We changed your antibiotics to Keflex to help with your UTI.  We think it is unlikely that you have a condition that requires further emergency treatment at this time.      Please return to the nearest ED or call 911 if you experience:    Worsening symptoms, severe pain, difficulty breathing, chest pain, fever that doesn't break with Tylenol or Motrin, uncontrolled vomiting or diarrhea that doesn't stop, passing out, lethargy (drowsiness, hard to wake up), numbness/tingling/weakness on one side, speech difficulty, cannot walk, or any concerns for limb/life threatening issues.    The Emergency Department is open 24 hours a day.     Please follow up with: your primary care doctor within 1-3 days.

## 2025-06-05 LAB — BACTERIA SPEC AEROBE CULT: NORMAL

## 2025-06-09 ENCOUNTER — HOSPITAL ENCOUNTER (OUTPATIENT)
Dept: CT IMAGING | Facility: HOSPITAL | Age: 75
Discharge: HOME | End: 2025-06-09
Admitting: INTERNAL MEDICINE
Payer: MEDICARE

## 2025-06-09 DIAGNOSIS — J47.9 ADULT BRONCHIECTASIS: ICD-10-CM

## 2025-06-09 LAB
BACTERIA SPEC AEROBE CULT: NORMAL
BACTERIA SPEC AEROBE CULT: NORMAL

## 2025-06-09 PROCEDURE — 71250 CT THORAX DX C-: CPT

## 2025-06-16 ENCOUNTER — TELEPHONE (OUTPATIENT)
Dept: FAMILY MEDICINE CLINIC | Facility: CLINIC | Age: 75
End: 2025-06-16
Payer: MEDICARE

## 2025-06-16 DIAGNOSIS — R60.0 PERIPHERAL EDEMA: Primary | ICD-10-CM

## 2025-06-16 RX ORDER — ATORVASTATIN CALCIUM 40 MG/1
40 TABLET, FILM COATED ORAL EVERY EVENING
Qty: 90 TABLET | Refills: 0 | Status: SHIPPED | OUTPATIENT
Start: 2025-06-16

## 2025-06-16 RX ORDER — FUROSEMIDE 40 MG/1
40 TABLET ORAL DAILY
Qty: 30 TABLET | Refills: 0 | Status: SHIPPED | OUTPATIENT
Start: 2025-06-16

## 2025-06-16 NOTE — TELEPHONE ENCOUNTER
I talked with daughter. Patients arms are swollen and her legs are seeping fluid and soaking her socks.     I suggest that they go to the ER. Patient is refusing. The daughter is wanting to know if she can start a water pill.     She does have home health going to the house,

## 2025-06-16 NOTE — TELEPHONE ENCOUNTER
Rx Refill Note  Requested Prescriptions     Pending Prescriptions Disp Refills    atorvastatin (LIPITOR) 40 MG tablet [Pharmacy Med Name: ATORVASTATIN 40MG TABLETS] 90 tablet      Sig: TAKE 1 TABLET BY MOUTH EVERY EVENING      Last office visit with prescribing clinician: 4/22/2025   Last telemedicine visit with prescribing clinician: Visit date not found   Next office visit with prescribing clinician: 6/24/2025

## 2025-06-16 NOTE — TELEPHONE ENCOUNTER
We can try to send something in, but she is going to be urinating a lot and there is no way to know what exactly is going on.  I will send some lasix but really need to monitor blood pressure and if she is feeling short of breath or not improving, may need to still go to ED

## 2025-06-17 ENCOUNTER — TELEPHONE (OUTPATIENT)
Dept: FAMILY MEDICINE CLINIC | Facility: CLINIC | Age: 75
End: 2025-06-17
Payer: MEDICARE

## 2025-06-17 DIAGNOSIS — L03.119 CELLULITIS OF LOWER EXTREMITY, UNSPECIFIED LATERALITY: Primary | ICD-10-CM

## 2025-06-17 NOTE — TELEPHONE ENCOUNTER
Patients daughter requesting guidance on what she should be doing to help with sores and dry skin on leg. Please advise

## 2025-06-17 NOTE — TELEPHONE ENCOUNTER
Spoke with patients daughter. She stated she is having some labored breathing when ambulating from her chair to the bathroom but otherwise seems okay with breathing. She will monitor blood pressure and breathing. If she starts to have distressed breathing when not active she will try to get her to the ED. She is still refusing ED. Daughter understands if symptoms worsen she will put the pressure on her to get her to the ED. She stated that her legs are still swollen and sore, she has some dry sore spots. She will send a picture via [employer] to see if they are something that should be concerned about.   Be advised

## 2025-06-17 NOTE — TELEPHONE ENCOUNTER
BS      6/17/25  8:29 AM  Please see attached picture. I took Friday of Mom’s legs regarding the swelling and leakage she has going on.   Attachments   [id number]

## 2025-06-18 NOTE — TELEPHONE ENCOUNTER
If there is a lot of redness and swelling in the leg it is possible that this could be an infection.  You don't usually see a green tent to exudate.  That could be an indication of pseudomonas bacterial infection.  Only oral medications that are used typically are Cipro and levofloxacin.  However, it looks like she has a hard time with Cipro.  We might be able to use Bactrim, but this is not always effective.  The issue with using Bactrim also is the patient is on warfarin.  When on warfarin, Bactrim can increase the concentration of anticoagulant and can cause bleeding.  Now that she is on water pill, lets see if leg improves.  If not she may need IV antibiotics.

## 2025-06-20 RX ORDER — BUDESONIDE AND FORMOTEROL FUMARATE DIHYDRATE 160; 4.5 UG/1; UG/1
2 AEROSOL RESPIRATORY (INHALATION) 2 TIMES DAILY
Qty: 10.2 G | Refills: 0 | Status: SHIPPED | OUTPATIENT
Start: 2025-06-20 | End: 2025-06-20

## 2025-06-20 RX ORDER — BUDESONIDE AND FORMOTEROL FUMARATE DIHYDRATE 160; 4.5 UG/1; UG/1
2 AEROSOL RESPIRATORY (INHALATION) 2 TIMES DAILY
Qty: 30.6 G | Refills: 0 | Status: SHIPPED | OUTPATIENT
Start: 2025-06-20

## 2025-06-24 ENCOUNTER — OFFICE VISIT (OUTPATIENT)
Dept: FAMILY MEDICINE CLINIC | Facility: CLINIC | Age: 75
End: 2025-06-24
Payer: MEDICARE

## 2025-06-24 VITALS
HEIGHT: 65 IN | DIASTOLIC BLOOD PRESSURE: 74 MMHG | HEART RATE: 74 BPM | WEIGHT: 207 LBS | OXYGEN SATURATION: 94 % | BODY MASS INDEX: 34.49 KG/M2 | SYSTOLIC BLOOD PRESSURE: 98 MMHG

## 2025-06-24 DIAGNOSIS — L97.909 VENOUS STASIS ULCER, UNSPECIFIED SITE, UNSPECIFIED ULCER STAGE, UNSPECIFIED WHETHER VARICOSE VEINS PRESENT: ICD-10-CM

## 2025-06-24 DIAGNOSIS — B37.9 YEAST INFECTION: ICD-10-CM

## 2025-06-24 DIAGNOSIS — N39.0 RECURRENT UTI (URINARY TRACT INFECTION): Primary | ICD-10-CM

## 2025-06-24 DIAGNOSIS — R41.3 MEMORY LOSS: ICD-10-CM

## 2025-06-24 DIAGNOSIS — I83.009 VENOUS STASIS ULCER, UNSPECIFIED SITE, UNSPECIFIED ULCER STAGE, UNSPECIFIED WHETHER VARICOSE VEINS PRESENT: ICD-10-CM

## 2025-06-24 LAB
BILIRUB BLD-MCNC: NEGATIVE MG/DL
CLARITY, POC: CLEAR
COLOR UR: ABNORMAL
EXPIRATION DATE: ABNORMAL
GLUCOSE UR STRIP-MCNC: NEGATIVE MG/DL
KETONES UR QL: NEGATIVE
LEUKOCYTE EST, POC: ABNORMAL
Lab: ABNORMAL
NITRITE UR-MCNC: NEGATIVE MG/ML
PH UR: 6.5 [PH] (ref 5–8)
PROT UR STRIP-MCNC: NEGATIVE MG/DL
RBC # UR STRIP: NEGATIVE /UL
SP GR UR: 1.01 (ref 1–1.03)
UROBILINOGEN UR QL: ABNORMAL

## 2025-06-24 RX ORDER — NYSTATIN 100000 [USP'U]/G
POWDER TOPICAL EVERY 12 HOURS SCHEDULED
Qty: 60 G | Refills: 5 | Status: SHIPPED | OUTPATIENT
Start: 2025-06-24

## 2025-06-26 LAB
BACTERIA UR CULT: NORMAL
BACTERIA UR CULT: NORMAL

## 2025-06-30 ENCOUNTER — APPOINTMENT (OUTPATIENT)
Dept: WOUND CARE | Facility: HOSPITAL | Age: 75
End: 2025-06-30
Payer: MEDICARE

## 2025-06-30 PROCEDURE — G0463 HOSPITAL OUTPT CLINIC VISIT: HCPCS

## 2025-07-04 NOTE — PROGRESS NOTES
Everton Basurto,   [facility]  [address]  [place] [id number] [phone]    Subjective      Name Nikki Merino MRN [id number]    1950 AGE/SEX 75 y.o. / female      Chief Complaint Chief Complaint   Patient presents with    Diabetes    Urinary Tract Infection     Was seen in ED and given new abx which has finished     Edema     Drainage and swelling, swelling has gone down some but sore on right leg is still drainage. Has been having cramping in hands since starting diuretic. Wondering if she will need to be on that long term. If so she would like to know what will help with the cramps     Memory Loss     Forgetting how to do things that she should not          Visit History for  2025    Nikki Merino is a 75 y.o. female who presented today for Diabetes, Urinary Tract Infection (Was seen in ED and given new abx which has finished ), Edema (Drainage and swelling, swelling has gone down some but sore on right leg is still drainage. Has been having cramping in hands since starting diuretic. Wondering if she will need to be on that long term. If so she would like to know what will help with the cramps ), and Memory Loss (Forgetting how to do things that she should not )       History of Present Illness  The patient is a 75-year-old female who presents for evaluation of memory issues, hand cramping, fatigue, and venous stasis ulcer. She is accompanied by her daughter.    The patient's daughter reports that she has been experiencing severe hand cramping, to the extent that she is unable to hold utensils or eat. The onset of these symptoms is uncertain, but they have been present for at least a week. She also experiences shakiness in her hands. Her home health nurse suggested that her diuretics could be causing muscle cramping and recommended increasing her potassium intake through foods like raisins or bananas. Difficulty writing due to hand tremors is noted.  Her other daughter has ordered wide-handled utensils and holders for her toothbrush and pens, which have not yet been used. Efforts to increase water intake have been made, and she has reduced her consumption of Coke. However, she tends to sip her drinks slowly, with an 8-ounce cup lasting her half a day.    Memory issues have been present over the past 2 to 3 months, including forgetting how to use her nebulizer and answer the house phone. She engages in exercises with her therapist, which she believes are beneficial. She is able to ambulate independently within her home without the need for a cane or walker. A calendar is maintained for appointments and notes. Books and word search puzzles are available but not actively used. Short-term memory appears to be more affected than long-term memory. She has not been working on her memoirs due to hand tremors. A CT scan performed on 06/04/2025 in the emergency room revealed only minor changes attributable to aging.    Occasional monitoring of blood pressure at home typically aligns with today's reading of 98/70. Fatigue has been a concern for several years, often feeling tired and lacking energy. She tends to fall asleep while watching television. However, there are days when she is active, walking around and performing household tasks. A history of atrial fibrillation is noted, and an upcoming appointment with Dr. Womack is scheduled for 09/03/2025.    Approximately 1.5 weeks ago, a water blister developed on her leg, which was drained but continues to leak. Consideration of bandaging it at night is mentioned. Swelling in her forearms, which has since subsided, was noted after an IV was placed on 06/04/2025. Attempts to use compression socks resulted in soreness in her legs after a day of use.       Medications and Allergies   Current Outpatient Medications   Medication Instructions    acetaminophen (TYLENOL) 500 mg, Every 6 Hours PRN    albuterol sulfate  (90  "Base) MCG/ACT inhaler 2 puffs, Every 4 Hours PRN    atorvastatin (LIPITOR) 40 mg, Oral, Every Evening    budesonide-formoterol (SYMBICORT) 160-4.5 MCG/ACT inhaler 2 puffs, Inhalation, 2 Times Daily    fluticasone (FLONASE) 50 MCG/ACT nasal spray 2 sprays, Daily    furosemide (LASIX) 40 mg, Oral, Daily    ipratropium-albuterol (DUO-NEB) 0.5-2.5 mg/3 ml nebulizer 1 vial, Every 6 Hours    lactulose (CHRONULAC) 10 g, 3 Times Daily    metFORMIN ER (GLUCOPHAGE-XR) 500 mg, Oral, Daily With Breakfast    metoprolol succinate XL (TOPROL-XL) 25 mg, Daily    montelukast (SINGULAIR) 10 MG tablet 1 tablet, Daily    nystatin (MYCOSTATIN) 523725 UNIT/GM powder Topical, Every 12 Hours Scheduled    O2 (OXYGEN) 2 L/min, Every Night at Bedtime    sertraline (ZOLOFT) 25 MG tablet 1 tablet, Daily    Spacer/Aero-Holding Chambers (OPTICHAMBER ADVANTAGE) misc Not Applicable    spironolactone (ALDACTONE) 100 mg, Daily    vitamin B-12 (CYANOCOBALAMIN) 100 mcg, Daily    warfarin (COUMADIN) 7.5 mg, Every 24 Hours    warfarin (COUMADIN) 7.5 mg, Oral, Daily Warfarin     Allergies   Allergen Reactions    Ciprofloxacin     Diltiazem Nausea Only    Fenofibrate Nausea Only    Meperidine And Related     Penicillins     Rosuvastatin Other (See Comments)     JOINT PAIN      I have reviewed the above medications and allergies     Objective:      Vitals Vitals:    06/24/25 1343   BP: 98/74   BP Location: Right arm   Patient Position: Sitting   Cuff Size: Large Adult   Pulse: 74   SpO2: 94%   Weight: 93.9 kg (207 lb)   Height: 165.1 cm (65\")     Body mass index is 34.45 kg/m².    Physical Exam     Physical Exam  Cardiovascular: Regular rate and rhythm, no murmurs, rubs, or gallops  Extremities: Venostasis ulcer on the side of the leg, venous stasis dermatitis, forearms previously swollen with bruising from IV  Skin: Venous stasis dermatitis on the legs, no signs of infection, wound on the leg leaking fluid     Results  Labs   - Potassium levels: Within " normal range   - Urinalysis: Concentrated but no signs of UTI    Imaging   - CT scan: 06/04/2025, Small changes due to aging, no major changes or issues   - CT scan of the pulmonary artery: 06/09/2025, Small vessel ischemic change     Assessment/Plan   Issues Addressed/ Plan   Diagnosis Plan   1. Recurrent UTI (urinary tract infection)  POCT urinalysis dipstick, automated    Urine Culture, Comprehen ([facility]) - Urine, Clean Catch      2. Memory loss  POCT urinalysis dipstick, automated    Ambulatory Referral to Neurology      3. Venous stasis ulcer, unspecified site, unspecified ulcer stage, unspecified whether varicose veins present  Ambulatory Referral to Wound Clinic      4. Yeast infection  nystatin (MYCOSTATIN) [id number] UNIT/GM powder    Urine Culture, Comprehen ([facility]) - Urine, Clean Catch         Assessment & Plan  1. Memory issues.  - Experiencing memory issues, including forgetting how to use her nebulizer and answering the house phone, ongoing for the last 2-3 months.  - Passed Mini-Mental exam previously; advised to engage in activities that stimulate her brain, such as puzzles and coloring.  - Discussed the importance of keeping a calendar and schedule; she has a calendar for appointments.  - Referral to a neurologist will be made for further evaluation.    2. Hand cramping.  - Experiences hand cramping, particularly when holding utensils for extended periods.  - Potassium levels checked recently and were within normal range; advised to increase potassium intake through foods like bananas and raisins.  - Home health nurse suggested diuretics could be causing muscle cramping.  - Wide-handled utensils and holders for toothbrush and pens are ordered to help with hand cramping.    3. Fatigue.  - Reports feeling tired and lacking energy, which may be related to low blood pressure.  - Blood pressure was 98/70 today, lower than usual readings of 130/80 or higher.  - Advised to monitor blood pressure daily for a  couple of weeks; dosage of spironolactone will be reduced from 150 mg to 100 mg to see if this improves energy levels.  - Encouraged to drink more water, especially a full glass at meals.    4. Venous stasis ulcer.  - Venous stasis ulcer on leg, leaking fluid; redness indicative of venous stasis dermatitis.  - Advised to elevate legs while sitting at home and wear compression socks; discussed options for easier-to-wear compression socks.  - Referral to wound care will be made for further management; advised to keep legs clean and moisturized with Aquaphor.  - Wound care will involve cleaning the ulcer and managing swelling to promote healing.    5. Diabetes Mellitus.  - A1c test will be conducted during the next visit to assess the severity of diabetes and determine the necessity of continuing metformin.  - Encouraged to check blood sugar levels at home; noted that current test strips are outdated.        There are no Patient Instructions on file for this visit.   Follow up  recommended Return in about 3 months (around 9/24/2025).   - Dragon voice recognition software was utilized to complete this chart.  Every reasonable attempt was made to edit and correct the text, however some incorrect words may remain.   Everton Basurto DO    Patient or patient representative verbalized consent for the use of Ambient Listening during the visit with  Everton Basurto DO for chart documentation. 7/4/2025  18:25 EDT

## 2025-07-07 ENCOUNTER — APPOINTMENT (OUTPATIENT)
Dept: WOUND CARE | Facility: HOSPITAL | Age: 75
End: 2025-07-07
Payer: MEDICARE

## 2025-07-14 ENCOUNTER — TRANSCRIBE ORDERS (OUTPATIENT)
Dept: ADMINISTRATIVE | Facility: HOSPITAL | Age: 75
End: 2025-07-14
Payer: MEDICARE

## 2025-07-14 ENCOUNTER — APPOINTMENT (OUTPATIENT)
Dept: WOUND CARE | Facility: HOSPITAL | Age: 75
End: 2025-07-14
Payer: MEDICARE

## 2025-07-14 DIAGNOSIS — J47.9 BRONCHIECTASIS, UNCOMPLICATED: Primary | ICD-10-CM

## 2025-07-29 ENCOUNTER — APPOINTMENT (OUTPATIENT)
Dept: WOUND CARE | Facility: HOSPITAL | Age: 75
End: 2025-07-29
Payer: MEDICARE

## 2025-07-29 PROCEDURE — G0463 HOSPITAL OUTPT CLINIC VISIT: HCPCS

## 2025-08-11 ENCOUNTER — APPOINTMENT (OUTPATIENT)
Dept: WOUND CARE | Facility: HOSPITAL | Age: 75
End: 2025-08-11
Payer: MEDICARE

## 2025-08-11 PROCEDURE — G0463 HOSPITAL OUTPT CLINIC VISIT: HCPCS

## 2025-08-12 RX ORDER — WARFARIN SODIUM 7.5 MG/1
7.5 TABLET ORAL DAILY
Qty: 90 TABLET | Refills: 0 | Status: SHIPPED | OUTPATIENT
Start: 2025-08-12

## 2025-08-13 DIAGNOSIS — E11.65 TYPE 2 DIABETES MELLITUS WITH HYPERGLYCEMIA, WITHOUT LONG-TERM CURRENT USE OF INSULIN: ICD-10-CM

## 2025-08-13 RX ORDER — METFORMIN HYDROCHLORIDE 500 MG/1
500 TABLET, EXTENDED RELEASE ORAL
Qty: 90 TABLET | Refills: 1 | Status: SHIPPED | OUTPATIENT
Start: 2025-08-13